# Patient Record
Sex: FEMALE | Race: BLACK OR AFRICAN AMERICAN
[De-identification: names, ages, dates, MRNs, and addresses within clinical notes are randomized per-mention and may not be internally consistent; named-entity substitution may affect disease eponyms.]

---

## 2019-01-03 ENCOUNTER — HOSPITAL ENCOUNTER (OUTPATIENT)
Dept: HOSPITAL 75 - WSO | Age: 39
Discharge: HOME | End: 2019-01-03
Attending: OBSTETRICS & GYNECOLOGY
Payer: MEDICAID

## 2019-01-03 VITALS — BODY MASS INDEX: 34.68 KG/M2 | HEIGHT: 59 IN | WEIGHT: 172 LBS

## 2019-01-03 VITALS — SYSTOLIC BLOOD PRESSURE: 117 MMHG | DIASTOLIC BLOOD PRESSURE: 70 MMHG

## 2019-01-03 VITALS — SYSTOLIC BLOOD PRESSURE: 114 MMHG | DIASTOLIC BLOOD PRESSURE: 66 MMHG

## 2019-01-03 DIAGNOSIS — O47.9: Primary | ICD-10-CM

## 2019-01-03 PROCEDURE — 99213 OFFICE O/P EST LOW 20 MIN: CPT

## 2019-01-03 NOTE — NUR
MEKHI BLANKENSHIP presented to unit via W/C from ED, accompanied by S/O, with c/o FALL,STOMACH 
PAIN. MEKHI BLANKENSHIP weighed, gowned, voided, and to bed.  EFHM and TOCO applied, VS taken.  
MEKHI BLANKENSHIP oriented to bed controls, call light, TV, heat, and A/C controls.

## 2019-01-08 NOTE — PHYSICIAN QUERY-FINAL DX
NICOLÁS SPANN 1/8/19 0048:


Clinic Account Progress/Dx


Physician Query:


Please give diagnosis


Date of Service





Luis Carlos 3, 2019 at 18:44





KARISSA AUGUSTIN MD 1/8/19 0728:


Clinic Account Progress/Dx


DIAGNOSIS:


Diagnosis


False labor











NICOLÁS SPANN Jan 8, 2019 00:48


KARISSA AUGUSTIN MD Jan 8, 2019 07:28

## 2019-02-11 ENCOUNTER — HOSPITAL ENCOUNTER (OUTPATIENT)
Dept: HOSPITAL 75 - LABNPT | Age: 39
End: 2019-02-11
Attending: OBSTETRICS & GYNECOLOGY
Payer: MEDICAID

## 2019-02-11 DIAGNOSIS — O28.8: Primary | ICD-10-CM

## 2019-02-11 LAB
CREAT UR-MCNC: 74 MG/DL (ref 30–125)
PROT UR-MCNC: 12 MG/DL (ref 6–12)

## 2019-02-11 PROCEDURE — 84156 ASSAY OF PROTEIN URINE: CPT

## 2019-02-11 PROCEDURE — 82570 ASSAY OF URINE CREATININE: CPT

## 2019-02-14 ENCOUNTER — HOSPITAL ENCOUNTER (OUTPATIENT)
Dept: HOSPITAL 75 - LABNPT | Age: 39
End: 2019-02-14
Attending: OBSTETRICS & GYNECOLOGY
Payer: MEDICAID

## 2019-02-14 DIAGNOSIS — O14.03: Primary | ICD-10-CM

## 2019-02-14 LAB
CREAT UR-MCNC: 54 MG/DL (ref 30–125)
PROT UR-MCNC: < 6 MG/DL (ref 6–12)

## 2019-02-14 PROCEDURE — 82570 ASSAY OF URINE CREATININE: CPT

## 2019-02-14 PROCEDURE — 84156 ASSAY OF PROTEIN URINE: CPT

## 2019-02-25 ENCOUNTER — HOSPITAL ENCOUNTER (OUTPATIENT)
Dept: HOSPITAL 75 - LABNPT | Age: 39
End: 2019-02-25
Attending: OBSTETRICS & GYNECOLOGY
Payer: MEDICAID

## 2019-02-25 DIAGNOSIS — O14.03: Primary | ICD-10-CM

## 2019-02-25 LAB
CREAT UR-MCNC: 69 MG/DL (ref 30–125)
PROT UR-MCNC: 38 MG/DL (ref 6–12)

## 2019-02-25 PROCEDURE — 84156 ASSAY OF PROTEIN URINE: CPT

## 2019-02-25 PROCEDURE — 82570 ASSAY OF URINE CREATININE: CPT

## 2019-02-28 ENCOUNTER — HOSPITAL ENCOUNTER (INPATIENT)
Dept: HOSPITAL 75 - LDRP | Age: 39
LOS: 3 days | Discharge: HOME | End: 2019-03-03
Attending: OBSTETRICS & GYNECOLOGY | Admitting: OBSTETRICS & GYNECOLOGY
Payer: MEDICAID

## 2019-02-28 ENCOUNTER — HOSPITAL ENCOUNTER (OUTPATIENT)
Dept: HOSPITAL 75 - LABNPT | Age: 39
End: 2019-02-28
Attending: OBSTETRICS & GYNECOLOGY
Payer: MEDICAID

## 2019-02-28 VITALS — SYSTOLIC BLOOD PRESSURE: 146 MMHG | DIASTOLIC BLOOD PRESSURE: 92 MMHG

## 2019-02-28 VITALS — DIASTOLIC BLOOD PRESSURE: 94 MMHG | SYSTOLIC BLOOD PRESSURE: 138 MMHG

## 2019-02-28 VITALS — SYSTOLIC BLOOD PRESSURE: 172 MMHG | DIASTOLIC BLOOD PRESSURE: 102 MMHG

## 2019-02-28 VITALS — BODY MASS INDEX: 35.49 KG/M2 | WEIGHT: 176.05 LBS | HEIGHT: 59 IN

## 2019-02-28 VITALS — DIASTOLIC BLOOD PRESSURE: 103 MMHG | SYSTOLIC BLOOD PRESSURE: 159 MMHG

## 2019-02-28 VITALS — DIASTOLIC BLOOD PRESSURE: 90 MMHG | SYSTOLIC BLOOD PRESSURE: 140 MMHG

## 2019-02-28 VITALS — DIASTOLIC BLOOD PRESSURE: 96 MMHG | SYSTOLIC BLOOD PRESSURE: 151 MMHG

## 2019-02-28 VITALS — DIASTOLIC BLOOD PRESSURE: 134 MMHG | SYSTOLIC BLOOD PRESSURE: 151 MMHG

## 2019-02-28 VITALS — SYSTOLIC BLOOD PRESSURE: 147 MMHG | DIASTOLIC BLOOD PRESSURE: 98 MMHG

## 2019-02-28 VITALS — SYSTOLIC BLOOD PRESSURE: 153 MMHG | DIASTOLIC BLOOD PRESSURE: 100 MMHG

## 2019-02-28 VITALS — DIASTOLIC BLOOD PRESSURE: 107 MMHG | SYSTOLIC BLOOD PRESSURE: 151 MMHG

## 2019-02-28 VITALS — DIASTOLIC BLOOD PRESSURE: 89 MMHG | SYSTOLIC BLOOD PRESSURE: 119 MMHG

## 2019-02-28 VITALS — SYSTOLIC BLOOD PRESSURE: 117 MMHG | DIASTOLIC BLOOD PRESSURE: 86 MMHG

## 2019-02-28 VITALS — DIASTOLIC BLOOD PRESSURE: 107 MMHG | SYSTOLIC BLOOD PRESSURE: 169 MMHG

## 2019-02-28 VITALS — DIASTOLIC BLOOD PRESSURE: 82 MMHG | SYSTOLIC BLOOD PRESSURE: 132 MMHG

## 2019-02-28 VITALS — SYSTOLIC BLOOD PRESSURE: 142 MMHG | DIASTOLIC BLOOD PRESSURE: 96 MMHG

## 2019-02-28 VITALS — SYSTOLIC BLOOD PRESSURE: 155 MMHG | DIASTOLIC BLOOD PRESSURE: 100 MMHG

## 2019-02-28 DIAGNOSIS — O28.8: Primary | ICD-10-CM

## 2019-02-28 DIAGNOSIS — Z3A.36: ICD-10-CM

## 2019-02-28 DIAGNOSIS — O34.211: ICD-10-CM

## 2019-02-28 DIAGNOSIS — Z79.84: ICD-10-CM

## 2019-02-28 DIAGNOSIS — O14.13: Primary | ICD-10-CM

## 2019-02-28 DIAGNOSIS — O24.415: ICD-10-CM

## 2019-02-28 LAB
BASOPHILS # BLD AUTO: 0.1 10^3/UL (ref 0–0.1)
BASOPHILS NFR BLD AUTO: 1 % (ref 0–10)
CREAT UR-MCNC: 43 MG/DL (ref 30–125)
EOSINOPHIL # BLD AUTO: 0.1 10^3/UL (ref 0–0.3)
EOSINOPHIL NFR BLD AUTO: 1 % (ref 0–10)
ERYTHROCYTE [DISTWIDTH] IN BLOOD BY AUTOMATED COUNT: 15 % (ref 10–14.5)
HCT VFR BLD CALC: 31 % (ref 35–52)
HGB BLD-MCNC: 9.9 G/DL (ref 11.5–16)
LYMPHOCYTES # BLD AUTO: 2.9 X 10^3 (ref 1–4)
LYMPHOCYTES NFR BLD AUTO: 31 % (ref 12–44)
MANUAL DIFFERENTIAL PERFORMED BLD QL: NO
MCH RBC QN AUTO: 27 PG (ref 25–34)
MCHC RBC AUTO-ENTMCNC: 33 G/DL (ref 32–36)
MCV RBC AUTO: 83 FL (ref 80–99)
MONOCYTES # BLD AUTO: 0.4 X 10^3 (ref 0–1)
MONOCYTES NFR BLD AUTO: 5 % (ref 0–12)
NEUTROPHILS # BLD AUTO: 5.9 X 10^3 (ref 1.8–7.8)
NEUTROPHILS NFR BLD AUTO: 64 % (ref 42–75)
PLATELET # BLD: 227 10^3/UL (ref 130–400)
PMV BLD AUTO: 9.3 FL (ref 7.4–10.4)
PROT UR-MCNC: 20 MG/DL (ref 6–12)
WBC # BLD AUTO: 9.3 10^3/UL (ref 4.3–11)

## 2019-02-28 PROCEDURE — 86901 BLOOD TYPING SEROLOGIC RH(D): CPT

## 2019-02-28 PROCEDURE — 90686 IIV4 VACC NO PRSV 0.5 ML IM: CPT

## 2019-02-28 PROCEDURE — 82570 ASSAY OF URINE CREATININE: CPT

## 2019-02-28 PROCEDURE — 90715 TDAP VACCINE 7 YRS/> IM: CPT

## 2019-02-28 PROCEDURE — 84156 ASSAY OF PROTEIN URINE: CPT

## 2019-02-28 PROCEDURE — 82962 GLUCOSE BLOOD TEST: CPT

## 2019-02-28 PROCEDURE — 80053 COMPREHEN METABOLIC PANEL: CPT

## 2019-02-28 PROCEDURE — 90471 IMMUNIZATION ADMIN: CPT

## 2019-02-28 PROCEDURE — 36415 COLL VENOUS BLD VENIPUNCTURE: CPT

## 2019-02-28 PROCEDURE — 86850 RBC ANTIBODY SCREEN: CPT

## 2019-02-28 PROCEDURE — 86900 BLOOD TYPING SEROLOGIC ABO: CPT

## 2019-02-28 PROCEDURE — 85025 COMPLETE CBC W/AUTO DIFF WBC: CPT

## 2019-02-28 PROCEDURE — 94664 DEMO&/EVAL PT USE INHALER: CPT

## 2019-02-28 PROCEDURE — 83615 LACTATE (LD) (LDH) ENZYME: CPT

## 2019-02-28 RX ADMIN — Medication SCH MLS/HR: at 18:24

## 2019-02-28 RX ADMIN — OXYCODONE HYDROCHLORIDE AND ACETAMINOPHEN PRN TAB: 10; 325 TABLET ORAL at 21:00

## 2019-02-28 RX ADMIN — SODIUM CHLORIDE, SODIUM LACTATE, POTASSIUM CHLORIDE, AND CALCIUM CHLORIDE PRN MLS/HR: 600; 310; 30; 20 INJECTION, SOLUTION INTRAVENOUS at 15:27

## 2019-02-28 RX ADMIN — DOCUSATE SODIUM SCH MG: 100 CAPSULE ORAL at 20:59

## 2019-02-28 RX ADMIN — LABETALOL HCL SCH MG: 200 TABLET, FILM COATED ORAL at 23:00

## 2019-02-28 RX ADMIN — SODIUM CHLORIDE, SODIUM LACTATE, POTASSIUM CHLORIDE, AND CALCIUM CHLORIDE PRN MLS/HR: 600; 310; 30; 20 INJECTION, SOLUTION INTRAVENOUS at 16:10

## 2019-02-28 RX ADMIN — MAGNESIUM SULFATE IN WATER SCH MLS/HR: 40 INJECTION, SOLUTION INTRAVENOUS at 18:17

## 2019-02-28 NOTE — NUR
#20G IV x1 attempt to Rt.AC by KENNETH Tejeda.  site patent.  admission labs collected from 
site prior to IVF's infusing. LR bolus infusing with OR tubing per gravity.

IV attempt x1 by this RN, unsuccessful.

## 2019-02-28 NOTE — NUR
admitting assessment completed. pt reports c/o's HA x "several weeks" without visual 
changes.  DTR's WNL.  trace pitting pedal  edema noted. denies epigastric pain.  

admission paperwork completed. pt states "'s sister (Gabi Fisher, lives in ) will 
be guardian" of infant. pt will contact  after delivery of infant.

## 2019-02-28 NOTE — NUR
monitors dc'd.  pt ambulated to OB c/s room with OR staff @ side. pt stable with no sx's of 
distress noted.

## 2019-02-28 NOTE — HISTORY & PHYSICAL
History and Physical


Date Seen by Provider:  2019


Time Seen by Provider:  16:30


This patient is a 38-year-old   5 black female with an EDC of 4 

1419 putting her at 35 weeks gestation on this date seen in my clinic in follow-

up from [facility] evaluation concerning for developing preeclampsia.  Repeat lab 

work showed an elevated ALT an AST, an elevated LDH platelet count that was 

slightly decreasing and a urine protein creatinine ratio 0.47 and pressure was 

in the 140s over 100.  She is sent to labor and delivery for management 

including repeat  delivery secondary to severe preeclampsia with 

developing help syndrome patient denied ruptured membranes or bleeding.  A GBS 

culture had been obtained on this date and that was results of course is not 

available.  Patient's pregnancy was complicated by gestational diabetes for 

which she was on metformin taken 500 mg 3 times a day with acceptable blood 

sugar control on that regimen.





Allergies are to ibuprofen which causes a rash


Medications are metformin 500 mg 3 times a day and prenatal vitamins





Past medical history, past surgical history, obstetric history, family history, 

and social histories are per the antepartum record





HEENT exam is normal


Neck is supple with no lymphadenopathy and no thyromegaly


Abdomen gravid soft nontender nondistended


Extremities show clubbing or cyanosis.  There is no Homans sign.  There is some 

pretibial pitting edema and patient had DTRs are 3+ to 4 over 4 globally





Pelvic exam shows a cervix closed 50 percent effaced -2 station with a vertex 

presentation





Assessment and plan   35 week gestation with severe preeclampsia/help syndrome 

in a patient with 5 previous C-sections and a history of gestational diabetes.  

She was admitted and then fairly shortly taken to the operating room for repeat 

 delivery.  Plan would be for maintenance on IV magnesium post 

operatively until its apparent that preeclampsia is resolving


36 weeks gestation with severe preeclampsia 5 previous 





Allergies and Home Medications


Allergies


Coded Allergies:  


     ibuprofen (Unverified  Allergy, Mild, 09)





Home Medications


Ferrous Sulfate 325 Mg Tablet, 325 MG PO TIDWM


   Prescribed by: SEAN MONTEJO on 16 1525


Myf159/Iron Fumarate/FA/Dss 1 Each Tablet, 1 EACH PO DAILY, (Reported)





Patient Home Medication List


Home Medication List Reviewed:  Yes











KARISSA AUGUSTIN MD 2019 17:53

## 2019-02-28 NOTE — NUR
MEKHI BLANKENSHIP presented to unit via  ambulation, accompanied by fmaily members, with Pre-E 
and direct admit by Dr. Pt. weighed, gowned, voided, and to bed.  EFHM and TOCO applied, VS 
taken.  Pt. oriented to bed controls, call light, TV, heat, and A/C controls.

## 2019-02-28 NOTE — NUR
pt transferred to room 313 via bed with this RN and LUISA Conway @ side. familiarized with 
room surroundings.  call light within reach.

## 2019-03-01 VITALS — SYSTOLIC BLOOD PRESSURE: 131 MMHG | DIASTOLIC BLOOD PRESSURE: 89 MMHG

## 2019-03-01 VITALS — DIASTOLIC BLOOD PRESSURE: 98 MMHG | SYSTOLIC BLOOD PRESSURE: 155 MMHG

## 2019-03-01 VITALS — DIASTOLIC BLOOD PRESSURE: 84 MMHG | SYSTOLIC BLOOD PRESSURE: 132 MMHG

## 2019-03-01 VITALS — DIASTOLIC BLOOD PRESSURE: 94 MMHG | SYSTOLIC BLOOD PRESSURE: 146 MMHG

## 2019-03-01 VITALS — DIASTOLIC BLOOD PRESSURE: 85 MMHG | SYSTOLIC BLOOD PRESSURE: 129 MMHG

## 2019-03-01 VITALS — SYSTOLIC BLOOD PRESSURE: 133 MMHG | DIASTOLIC BLOOD PRESSURE: 87 MMHG

## 2019-03-01 VITALS — SYSTOLIC BLOOD PRESSURE: 135 MMHG | DIASTOLIC BLOOD PRESSURE: 92 MMHG

## 2019-03-01 VITALS — DIASTOLIC BLOOD PRESSURE: 88 MMHG | SYSTOLIC BLOOD PRESSURE: 129 MMHG

## 2019-03-01 VITALS — DIASTOLIC BLOOD PRESSURE: 95 MMHG | SYSTOLIC BLOOD PRESSURE: 160 MMHG

## 2019-03-01 VITALS — DIASTOLIC BLOOD PRESSURE: 76 MMHG | SYSTOLIC BLOOD PRESSURE: 121 MMHG

## 2019-03-01 VITALS — SYSTOLIC BLOOD PRESSURE: 151 MMHG | DIASTOLIC BLOOD PRESSURE: 97 MMHG

## 2019-03-01 VITALS — DIASTOLIC BLOOD PRESSURE: 107 MMHG | SYSTOLIC BLOOD PRESSURE: 158 MMHG

## 2019-03-01 LAB
ALBUMIN SERPL-MCNC: 2.8 GM/DL (ref 3.2–4.5)
ALP SERPL-CCNC: 126 U/L (ref 40–136)
ALT SERPL-CCNC: 134 U/L (ref 0–55)
BASOPHILS # BLD AUTO: 0 10^3/UL (ref 0–0.1)
BASOPHILS NFR BLD AUTO: 0 % (ref 0–10)
BILIRUB SERPL-MCNC: 0.2 MG/DL (ref 0.1–1)
BUN/CREAT SERPL: 3
CALCIUM SERPL-MCNC: 7.8 MG/DL (ref 8.5–10.1)
CHLORIDE SERPL-SCNC: 108 MMOL/L (ref 98–107)
CO2 SERPL-SCNC: 18 MMOL/L (ref 21–32)
CREAT SERPL-MCNC: 0.86 MG/DL (ref 0.6–1.3)
EOSINOPHIL # BLD AUTO: 0.1 10^3/UL (ref 0–0.3)
EOSINOPHIL NFR BLD AUTO: 1 % (ref 0–10)
ERYTHROCYTE [DISTWIDTH] IN BLOOD BY AUTOMATED COUNT: 14.9 % (ref 10–14.5)
GFR SERPLBLD BASED ON 1.73 SQ M-ARVRAT: > 60 ML/MIN
GLUCOSE SERPL-MCNC: 111 MG/DL (ref 70–105)
HCT VFR BLD CALC: 33 % (ref 35–52)
HGB BLD-MCNC: 10.3 G/DL (ref 11.5–16)
LYMPHOCYTES # BLD AUTO: 2.5 X 10^3 (ref 1–4)
LYMPHOCYTES NFR BLD AUTO: 28 % (ref 12–44)
MANUAL DIFFERENTIAL PERFORMED BLD QL: NO
MCH RBC QN AUTO: 26 PG (ref 25–34)
MCHC RBC AUTO-ENTMCNC: 31 G/DL (ref 32–36)
MCV RBC AUTO: 84 FL (ref 80–99)
MONOCYTES # BLD AUTO: 0.5 X 10^3 (ref 0–1)
MONOCYTES NFR BLD AUTO: 5 % (ref 0–12)
NEUTROPHILS # BLD AUTO: 5.9 X 10^3 (ref 1.8–7.8)
NEUTROPHILS NFR BLD AUTO: 66 % (ref 42–75)
PLATELET # BLD: 206 10^3/UL (ref 130–400)
PMV BLD AUTO: 9.2 FL (ref 7.4–10.4)
POTASSIUM SERPL-SCNC: 3.1 MMOL/L (ref 3.6–5)
PROT SERPL-MCNC: 5.7 GM/DL (ref 6.4–8.2)
SODIUM SERPL-SCNC: 137 MMOL/L (ref 135–145)
WBC # BLD AUTO: 9 10^3/UL (ref 4.3–11)

## 2019-03-01 RX ADMIN — OXYCODONE HYDROCHLORIDE AND ACETAMINOPHEN PRN TAB: 10; 325 TABLET ORAL at 23:05

## 2019-03-01 RX ADMIN — LABETALOL HCL SCH MG: 200 TABLET, FILM COATED ORAL at 21:03

## 2019-03-01 RX ADMIN — OXYCODONE HYDROCHLORIDE AND ACETAMINOPHEN PRN TAB: 10; 325 TABLET ORAL at 07:58

## 2019-03-01 RX ADMIN — OXYCODONE HYDROCHLORIDE AND ACETAMINOPHEN PRN TAB: 10; 325 TABLET ORAL at 17:10

## 2019-03-01 RX ADMIN — OXYCODONE HYDROCHLORIDE AND ACETAMINOPHEN PRN TAB: 10; 325 TABLET ORAL at 12:52

## 2019-03-01 RX ADMIN — DOCUSATE SODIUM SCH MG: 100 CAPSULE ORAL at 21:03

## 2019-03-01 RX ADMIN — OXYCODONE HYDROCHLORIDE AND ACETAMINOPHEN PRN TAB: 10; 325 TABLET ORAL at 03:19

## 2019-03-01 RX ADMIN — DOCUSATE SODIUM SCH MG: 100 CAPSULE ORAL at 09:12

## 2019-03-01 RX ADMIN — LABETALOL HCL SCH MG: 200 TABLET, FILM COATED ORAL at 09:11

## 2019-03-01 RX ADMIN — METOCLOPRAMIDE SCH MG: 10 TABLET ORAL at 14:12

## 2019-03-01 RX ADMIN — Medication SCH MLS/HR: at 06:39

## 2019-03-01 RX ADMIN — MAGNESIUM SULFATE IN WATER SCH MLS/HR: 40 INJECTION, SOLUTION INTRAVENOUS at 04:07

## 2019-03-01 NOTE — NUR
Rn to bedside.  pt resting in bed eyes closed.  vital signs obtained.  plan of care reviewed 
with pt.  pt dozing off when visiting with rn.  pt reports she did not get much sleep last 
night.

## 2019-03-01 NOTE — NUR
rn to pt bedside.  pt resting eyes closed.  plan of care reviewed with pt and verbalized 
understanding.  iv fluids to saline lock and moon catheter dc'd.  peircare, pad changed and 
underwear on.  noted small amt of rubra.

## 2019-03-01 NOTE — OPERATIVE REPORT
DATE OF SERVICE:  2019



PREOPERATIVE DIAGNOSIS:

A 35 weeks gestation with severe preeclampsia and 5 previous  sections.



POSTOPERATIVE DIAGNOSIS:

A 35 weeks gestation with severe preeclampsia and 5 previous  sections.



OPERATIVE PROCEDURE:

Repeat low transverse  delivery of a viable male infant with Apgars of

3, 6, and 8 at 1, 5 and 10 minutes respectively.  Weight is 5 lbs. 5 oz.  Cord 
blood

gas of 7.26 and a birth time of 17:07.



OPERATIVE DESCRIPTION:

With the patient in the supine position under satisfactory spinal analgesia, she

was prepped and draped in the usual fashion for abdominal surgery.  Gunter

catheter was placed in the urinary bladder.



Repeat Pfannenstiel incision made through skin with a scalpel by removing the

patient's previous Pfannenstiel incisional scar.  The abdomen was entered in the

usual manner.  Bladder retractor placed in position, clean scalpel used to make

a 4 cm hysterotomy incision transversely across the lower uterine segment that

was exceedingly thin and fibrotic secondary to her previous  deliveries.

 Copious clear fluid was released on hysterotomy.  A vigorous viable male infant

was delivered via the uterine incision.  Infant had stats as noted above.



The infant was bulb suctioned on delivery of the head and again on completion of

delivery.  The umbilical cord was doubly clamped and cut and baby was passed to

Dr. Mackay, the pediatrician in attendance for delivery.



Cord bloods were obtained.  The placenta delivered manually and was normal with

a 3-vessel cord.  The uterus was exteriorized and interior wiped clean with a

wet laparotomy sponge.  Uterine incision closed with a running locked suture of

2-0 Vicryl.  Hemostasis was satisfactory.  The uterus was returned to the

abdominal cavity.  All blood clot and debris was removed from the abdominal

cavity.  Prior to replacing the uterus to the abdomen, the bladder dome was

imbricated on itself secondary to some bleeding and some superficial vessels at

that point.  Hemostasis was complete.  The uterus was emptied of all blood clot

and debris and the anterior retroperitoneum was closed with a running suture of

2-0 Vicryl.  Rectus muscles were closed with that suture as well.  The rectus

fascia was closed with 2-0 Vicryl, subcutaneous tissue with 2-0 Vicryl and the

skin was stapled.



Sponge, needle counts were correct on completion of the procedure.  Estimated

blood loss for procedure was around 200 mL.  The patient tolerated the procedure

well and was transferred to recovery room in stable condition and started on

magnesium in the recovery room for her severe preeclampsia.  The baby was taken

to the full term nursery under the care of Dr. Mackay.





Job ID: [id number]

DocumentID: [id number]

Dictated Date:  2019 18:08:17

Transcription Date: 2019 03:25:48

Dictated By: KARISSA AUGUSTIN MD

MTDD

## 2019-03-01 NOTE — NUR
ambulates 50 feet and then back to room.  another warm blanket placed on right upper abd for 
comfort

## 2019-03-01 NOTE — NUR
pt requests to go to bathroom.  assisted out of bed and ambulates slowly to bathroom.  void, 
pericare (assisted by rn).  Ambulates from bathroom to room door and then back to bed.  
scd's resumed.  fresh ice water to bedside table.

pt c/o continued right upper abd pain.  warm blanket placed to area.

## 2019-03-01 NOTE — PROGRESS NOTE-STANDARD
Standard Progress Note


Progress Notes/Assess & Plan


Date Seen by a Provider:  Mar 1, 2019


Time Seen by a Provider:  07:25


Progress/Assessment & Plan


This patient is without complaint.  She has good pain control.  Patient denies 

nausea vomiting.  She is tolerating oral intake well.








Vital Signs








  Date Time  Temp Pulse Resp B/P (MAP) Pulse Ox O2 Delivery O2 Flow Rate FiO2


 


3/1/19 06:00  65 18     


 


3/1/19 05:00  66 18     


 


3/1/19 04:00 97.3 63 18 121/76 (91) 100 Room Air  


 


3/1/19 04:00  64 18     


 


3/1/19 03:00  64 18     


 


3/1/19 02:04     100 Room Air  


 


3/1/19 02:03  62 18     


 


3/1/19 01:00  56 18     


 


3/1/19 00:00 97.1 64 18 133/87 (102) 100 Room Air  


 


3/1/19 00:00  64 18     


 


19 23:00  69 18     


 


19 22:00  65 18     


 


19 20:10  63 18     


 


19 20:10 96.5  18 169/107 (127) 100 Room Air  


 


19 19:20  65 18     


 


19 18:50    117/86 (96)    


 


19 18:43 97.0  16 172/102 (125) 98 Room Air  


 


19 18:33 96.4 62 16 155/100 (118) 100 Room Air  


 


19 18:18 96.9 70 16 151/134 (140) 98 Room Air  


 


19 18:18  70 16     


 


19 18:01 97.1 85 16 142/96 (111) 98 Room Air  


 


19 17:48 97.2 77 16 119/89 (99) 100 Room Air  


 


19 16:35  90 18 159/103 (121)  Room Air  


 


19 16:20  86 18 153/100 (117)  Room Air  


 


19 16:05  91 18 132/82 (99)  Room Air  


 


19 15:50  93 18 146/92 (110)  Room Air  


 


19 15:11 98.1 98 18 151/96 (114)  Room Air  














I & O 


 


 3/1/19





 07:00


 


Intake Total 6050 ml


 


Output Total 7700 ml


 


Balance -1650 ml





Blood pressures have been somewhat labile but have responded to IV and by mouth 

labetalol and are acceptable at this point








Laboratory Tests








Test


 19


15:27 19


16:06 3/1/19


06:10 Range/Units


 


 


White Blood Count


 9.3 


 


 9.0 


 4.3-11.0


10^3/uL


 


Red Blood Count


 3.66 L


 


 3.92 L


 4.35-5.85


10^6/uL


 


Hemoglobin 9.9 L  10.3 L 11.5-16.0  G/DL


 


Hematocrit 31 L  33 L 35-52  %


 


Mean Corpuscular Volume 83   84  80-99  FL


 


Mean Corpuscular Hemoglobin 27   26  25-34  PG


 


Mean Corpuscular Hemoglobin


Concent 33 


 


 31 L


 32-36  G/DL





 


Red Cell Distribution Width 15.0 H  14.9 H 10.0-14.5  %


 


Platelet Count


 227 


 


 206 


 130-400


10^3/uL


 


Mean Platelet Volume 9.3   9.2  7.4-10.4  FL


 


Neutrophils (%) (Auto) 64   66  42-75  %


 


Lymphocytes (%) (Auto) 31   28  12-44  %


 


Monocytes (%) (Auto) 5   5  0-12  %


 


Eosinophils (%) (Auto) 1   1  0-10  %


 


Basophils (%) (Auto) 1   0  0-10  %


 


Neutrophils # (Auto) 5.9   5.9  1.8-7.8  X 10^3


 


Lymphocytes # (Auto) 2.9   2.5  1.0-4.0  X 10^3


 


Monocytes # (Auto) 0.4   0.5  0.0-1.0  X 10^3


 


Eosinophils # (Auto)


 0.1 


 


 0.1 


 0.0-0.3


10^3/uL


 


Basophils # (Auto)


 0.1 


 


 0.0 


 0.0-0.1


10^3/uL


 


Glucometer  241 H    MG/DL


 


Sodium Level   137  135-145  MMOL/L


 


Potassium Level   3.1 L 3.6-5.0  MMOL/L


 


Chloride Level   108 H   MMOL/L


 


Carbon Dioxide Level   18 L 21-32  MMOL/L


 


Anion Gap   11  5-14  MMOL/L


 


Blood Urea Nitrogen   3 L 7-18  MG/DL


 


Creatinine


 


 


 0.86 


 0.60-1.30


MG/DL


 


Estimat Glomerular Filtration


Rate 


 


 > 60 


  





 


BUN/Creatinine Ratio   3   


 


Glucose Level   111 H   MG/DL


 


Calcium Level   7.8 L 8.5-10.1  MG/DL


 


Corrected Calcium   8.8  8.5-10.1  MG/DL


 


Total Bilirubin   0.2  0.1-1.0  MG/DL


 


Aspartate Amino Transf


(AST/SGOT) 


 


 91 H


 5-34  U/L





 


Alanine Aminotransferase


(ALT/SGPT) 


 


 134 H


 0-55  U/L





 


Alkaline Phosphatase   126    U/L


 


Lactate Dehydrogenase   356 H 125-220  U/L


 


Total Protein   5.7 L 6.4-8.2  GM/DL


 


Albumin   2.8 L 3.2-4.5  GM/DL





Liver enzymes remain slightly elevated.  The LDH this is elevated as well.  

These are in the same range as the preadmission labs yesterday.





Physical exam shows a benign abdomen.


Extremities show no clubbing or cyanosis.  There is no Homans sign.





Assessment and plan   postoperative day number 1 status post repeat  

delivery at 36 weeks' gestation due to severe preeclampsia in a patient who is 

diabetic and has had prior C-sections 5.  It appears preeclampsia is resolving 

this point we will call her magnesium and her IV fluids and remove the Gunter 

catheter and allowing ambulation.  Continue the labetalol 100 mg twice a day in 

the short-term and a close attention to her blood pressures and other vitals











KARISSA AUGUSTIN MD Mar 1, 2019 07:28

## 2019-03-01 NOTE — NUR
PT IN BED, LYING DOWN. PAIN MEDS GIVEN PO PER PT REQUEST; SEE EMAR FOR FURTHER. PT DENIES 
ANY FURTHER NEEDS AT THIS TIME. CALL LIGHT WITHIN REACH.

## 2019-03-01 NOTE — NUR
pt reports right upper abd pain is better after heat and rates pain 8:10.  denies further 
needs at this time.

## 2019-03-01 NOTE — NUR
PT RESTING IN BED. ASSESSMENT COMPLETED. PT DENIES ANY NEEDS AT THIS TIME. WILL CONTINUE TO 
MONITOR.

## 2019-03-01 NOTE — ANESTHESIA-REGIONAL POST-OP
Regional


Patient Condition


Mental Status:  Alert, Oriented x3


Circulation:  Same as Pre-Op


Headache:  Absent


Sensation:  Full Recovery


Motor Block:  Absent





Post Op Complications


Complications


None





Follow Up Care/Instructions


Patient Instructions


None needed.





Anesthesia/Patient Condition


Patient is doing well, no complaints, stable vital signs, no apparent adverse 

anesthesia problems.   


No complications reported per nursing.











CARISSA ANN CRNA Mar 1, 2019 07:51

## 2019-03-02 VITALS — DIASTOLIC BLOOD PRESSURE: 94 MMHG | SYSTOLIC BLOOD PRESSURE: 147 MMHG

## 2019-03-02 VITALS — DIASTOLIC BLOOD PRESSURE: 79 MMHG | SYSTOLIC BLOOD PRESSURE: 127 MMHG

## 2019-03-02 VITALS — SYSTOLIC BLOOD PRESSURE: 136 MMHG | DIASTOLIC BLOOD PRESSURE: 82 MMHG

## 2019-03-02 VITALS — DIASTOLIC BLOOD PRESSURE: 80 MMHG | SYSTOLIC BLOOD PRESSURE: 130 MMHG

## 2019-03-02 VITALS — DIASTOLIC BLOOD PRESSURE: 81 MMHG | SYSTOLIC BLOOD PRESSURE: 133 MMHG

## 2019-03-02 RX ADMIN — LABETALOL HCL SCH MG: 200 TABLET, FILM COATED ORAL at 21:29

## 2019-03-02 RX ADMIN — OXYCODONE HYDROCHLORIDE AND ACETAMINOPHEN PRN TAB: 10; 325 TABLET ORAL at 23:43

## 2019-03-02 RX ADMIN — OXYCODONE HYDROCHLORIDE AND ACETAMINOPHEN PRN TAB: 10; 325 TABLET ORAL at 11:07

## 2019-03-02 RX ADMIN — METOCLOPRAMIDE SCH MG: 10 TABLET ORAL at 09:21

## 2019-03-02 RX ADMIN — OXYCODONE HYDROCHLORIDE AND ACETAMINOPHEN PRN TAB: 10; 325 TABLET ORAL at 04:59

## 2019-03-02 RX ADMIN — METOCLOPRAMIDE SCH MG: 10 TABLET ORAL at 21:28

## 2019-03-02 RX ADMIN — OXYCODONE HYDROCHLORIDE AND ACETAMINOPHEN PRN TAB: 10; 325 TABLET ORAL at 17:37

## 2019-03-02 RX ADMIN — METOCLOPRAMIDE SCH MG: 10 TABLET ORAL at 17:37

## 2019-03-02 RX ADMIN — DOCUSATE SODIUM SCH MG: 100 CAPSULE ORAL at 09:21

## 2019-03-02 RX ADMIN — DOCUSATE SODIUM SCH MG: 100 CAPSULE ORAL at 21:28

## 2019-03-02 RX ADMIN — LABETALOL HCL SCH MG: 200 TABLET, FILM COATED ORAL at 09:21

## 2019-03-02 NOTE — NUR
PT RESTING IN BED. ASSESSMENT COMPLETED. PT REQUESTING ICE CREAM AT THIS TIME. SUPERVISOR 
NOTIFIED.

## 2019-03-02 NOTE — NUR
FLU VACCINE GIVEN IM IN RIGHT DELTOID.  PT DENIES HAVING FLU SHOT. NO DOCUMENTATION OF FLU 
VACCINE.

## 2019-03-02 NOTE — DISCHARGE INSTRUCTIONS
Discharge Instructions


Discharge Medications


New, Converted or Re-Newed RX:  RX on Chart





Patient Instructions


Patient Instructions:  


As directed


Return to The Hospital For:  


As directed





Activity & Diet


Discharge Diet:  No Restrictions


Activity as Tolerated:  No





Orders-Post D/C & Referrals





Follow Up Appt:


RTC on 2019 at 930 a.m. for incision check.


Call to make follow up appt. for patient in 4 weeks.





Wound Care:


Remove staples, apply benzoin and steri strips.





Activity 


Per routine post  instructions.





Please call in RX to patient pharmacy.





Diet as tolerated





Patient may shower or tub bathe as desired.





Continue home meds











KARISSA AUGUSTIN MD Mar 2, 2019 09:00

## 2019-03-02 NOTE — PROGRESS NOTE-STANDARD
Standard Progress Note


Progress Notes/Assess & Plan


Date Seen by a Provider:  Mar 2, 2019


Time Seen by a Provider:  08:54


Progress/Assessment & Plan


This patient is without complaint.  She has good pain control.  Patient denies 

nausea vomiting.  She is tolerating oral intake well.








Vital Signs








  Date Time  Temp Pulse Resp B/P (MAP) Pulse Ox O2 Delivery O2 Flow Rate FiO2


 


3/1/19 06:00  65 18     


 


3/1/19 05:00  66 18     


 


3/1/19 04:00 97.3 63 18 121/76 (91) 100 Room Air  


 


3/1/19 04:00  64 18     


 


3/1/19 03:00  64 18     


 


3/1/19 02:04     100 Room Air  


 


3/1/19 02:03  62 18     


 


3/1/19 01:00  56 18     


 


3/1/19 00:00 97.1 64 18 133/87 (102) 100 Room Air  


 


3/1/19 00:00  64 18     


 


19 23:00  69 18     


 


19 22:00  65 18     


 


19 20:10  63 18     


 


19 20:10 96.5  18 169/107 (127) 100 Room Air  


 


19 19:20  65 18     


 


19 18:50    117/86 (96)    


 


19 18:43 97.0  16 172/102 (125) 98 Room Air  


 


19 18:33 96.4 62 16 155/100 (118) 100 Room Air  


 


19 18:18 96.9 70 16 151/134 (140) 98 Room Air  


 


19 18:18  70 16     


 


19 18:01 97.1 85 16 142/96 (111) 98 Room Air  


 


19 17:48 97.2 77 16 119/89 (99) 100 Room Air  


 


19 16:35  90 18 159/103 (121)  Room Air  


 


19 16:20  86 18 153/100 (117)  Room Air  


 


19 16:05  91 18 132/82 (99)  Room Air  


 


19 15:50  93 18 146/92 (110)  Room Air  


 


19 15:11 98.1 98 18 151/96 (114)  Room Air  














I & O 


 


 3/1/19





 07:00


 


Intake Total 6050 ml


 


Output Total 7700 ml


 


Balance -1650 ml





Blood pressures have been somewhat labile but have responded to IV and by mouth 

labetalol and are acceptable at this point








Laboratory Tests








Test


 19


15:27 19


16:06 3/1/19


06:10 Range/Units


 


 


White Blood Count


 9.3 


 


 9.0 


 4.3-11.0


10^3/uL


 


Red Blood Count


 3.66 L


 


 3.92 L


 4.35-5.85


10^6/uL


 


Hemoglobin 9.9 L  10.3 L 11.5-16.0  G/DL


 


Hematocrit 31 L  33 L 35-52  %


 


Mean Corpuscular Volume 83   84  80-99  FL


 


Mean Corpuscular Hemoglobin 27   26  25-34  PG


 


Mean Corpuscular Hemoglobin


Concent 33 


 


 31 L


 32-36  G/DL





 


Red Cell Distribution Width 15.0 H  14.9 H 10.0-14.5  %


 


Platelet Count


 227 


 


 206 


 130-400


10^3/uL


 


Mean Platelet Volume 9.3   9.2  7.4-10.4  FL


 


Neutrophils (%) (Auto) 64   66  42-75  %


 


Lymphocytes (%) (Auto) 31   28  12-44  %


 


Monocytes (%) (Auto) 5   5  0-12  %


 


Eosinophils (%) (Auto) 1   1  0-10  %


 


Basophils (%) (Auto) 1   0  0-10  %


 


Neutrophils # (Auto) 5.9   5.9  1.8-7.8  X 10^3


 


Lymphocytes # (Auto) 2.9   2.5  1.0-4.0  X 10^3


 


Monocytes # (Auto) 0.4   0.5  0.0-1.0  X 10^3


 


Eosinophils # (Auto)


 0.1 


 


 0.1 


 0.0-0.3


10^3/uL


 


Basophils # (Auto)


 0.1 


 


 0.0 


 0.0-0.1


10^3/uL


 


Glucometer  241 H    MG/DL


 


Sodium Level   137  135-145  MMOL/L


 


Potassium Level   3.1 L 3.6-5.0  MMOL/L


 


Chloride Level   108 H   MMOL/L


 


Carbon Dioxide Level   18 L 21-32  MMOL/L


 


Anion Gap   11  5-14  MMOL/L


 


Blood Urea Nitrogen   3 L 7-18  MG/DL


 


Creatinine


 


 


 0.86 


 0.60-1.30


MG/DL


 


Estimat Glomerular Filtration


Rate 


 


 > 60 


  





 


BUN/Creatinine Ratio   3   


 


Glucose Level   111 H   MG/DL


 


Calcium Level   7.8 L 8.5-10.1  MG/DL


 


Corrected Calcium   8.8  8.5-10.1  MG/DL


 


Total Bilirubin   0.2  0.1-1.0  MG/DL


 


Aspartate Amino Transf


(AST/SGOT) 


 


 91 H


 5-34  U/L





 


Alanine Aminotransferase


(ALT/SGPT) 


 


 134 H


 0-55  U/L





 


Alkaline Phosphatase   126    U/L


 


Lactate Dehydrogenase   356 H 125-220  U/L


 


Total Protein   5.7 L 6.4-8.2  GM/DL


 


Albumin   2.8 L 3.2-4.5  GM/DL





Liver enzymes remain slightly elevated.  The LDH this is elevated as well.  

These are in the same range as the preadmission labs yesterday.





Physical exam shows a benign abdomen.


Extremities show no clubbing or cyanosis.  There is no Homans sign.





Assessment and plan   postoperative day number 1 status post repeat  

delivery at 36 weeks' gestation due to severe preeclampsia in a patient who is 

diabetic and has had prior C-sections 5.  It appears preeclampsia is resolving 

this point we will call her magnesium and her IV fluids and remove the Gunter 

catheter and allowing ambulation.  Continue the labetalol 100 mg twice a day in 

the short-term and a close attention to her blood pressures and other vitals








2019


Patient is without complaint.  She is ablating, voiding, tolerating oral intake 

well has good control of her pain, patient denies chest pain, denies shortness 

of breath, denies headache, denies nausea vomiting.








Vital Signs








  Date Time  Temp Pulse Resp B/P (MAP) Pulse Ox O2 Delivery O2 Flow Rate FiO2


 


3/2/19 05:51 98.6 80 18 130/80 (97) 100 Room Air  


 


3/2/19 02:30 98.3 80 18 133/81 (98) 100 Room Air  


 


3/1/19 21:30 98.6 77 19 160/95 (116) 100 Room Air  


 


3/1/19 15:37 96.8 65 19 158/107 (124) 100 Room Air  


 


3/1/19 11:14 96.9 58 19 129/88 (102) 100 Room Air  


 


3/1/19 09:12 97.0       














I & O 


 


 3/2/19





 07:00


 


Intake Total 5815 ml


 


Output Total 7000 ml


 


Balance -1185 ml





Blood pressures are generally stable and are decreasing to a more normal range 

with the labetalol





The abdomen is benign.  Surgical incision is clean dry and intact.


Extremities show no clubbing or cyanosis.  There is no Homans sign.





Assessment and plan   postoperative day number 2 status post repeat  

delivery due to severe preeclampsia.  Patient doing well and we will entertain 

discharge home today or tomorrow as she prefers


Final Diagnosis


Repeat  delivery at 36 weeks gestation











KARISSA AUGUSTIN MD Mar 2, 2019 08:56

## 2019-03-02 NOTE — NUR
PERCOCET 10/325 2 TABS P.O. FOR C/O ABD PAIN. PT HAS BEEN RESTING THIS AFTERNOON. FAMILY 
BROUGHT SOME FOOD AND SHE IS CURRENTLY EATING.

## 2019-03-03 VITALS — SYSTOLIC BLOOD PRESSURE: 136 MMHG | DIASTOLIC BLOOD PRESSURE: 71 MMHG

## 2019-03-03 VITALS — SYSTOLIC BLOOD PRESSURE: 135 MMHG | DIASTOLIC BLOOD PRESSURE: 84 MMHG

## 2019-03-03 RX ADMIN — DOCUSATE SODIUM SCH MG: 100 CAPSULE ORAL at 08:32

## 2019-03-03 RX ADMIN — OXYCODONE HYDROCHLORIDE AND ACETAMINOPHEN PRN TAB: 10; 325 TABLET ORAL at 06:14

## 2019-03-03 RX ADMIN — LABETALOL HCL SCH MG: 200 TABLET, FILM COATED ORAL at 08:31

## 2019-03-03 RX ADMIN — METOCLOPRAMIDE SCH MG: 10 TABLET ORAL at 06:14

## 2019-03-03 NOTE — NUR
Discharge instructions explained, signed and copy to patient.  pt verbalized understanding 
of instructions and denied questions.  prescriptions called to pharmacy and percocet 
prescription handed to pt.  

gauze placed on lower left incision area due to skin on skin contact.  pt instructed to 
change out if/when soiled.  pt verbalized understanding.

## 2019-03-03 NOTE — XMS REPORT
Continuity of Care Document

 Created on: 2019



MEKHI BLANKENSHIP

External Reference #: [id number]

: 1980

Sex: Female



Demographics







 [place]  [address]

 

 Home Phone  [phone] x

 

 Preferred Language  Unknown

 

 Marital Status  Unknown

 

 Adventism Affiliation  Unknown

 

 Race  Unknown

 

 Ethnic Group  Unknown





Author







 Author  [facility]

 

 Address  Unknown

 

 Phone  Unavailable



              



Allergies

      





 Active            Description            Code            Type            
Severity            Reaction            Onset            Reported/Identified   
         Relationship to Patient            Clinical Status        

 

 Yes            Darvocet-N 100                         Drug Allergy            N
/A            N/A                         2009                           
       

 

 Yes            ibuprofen                         Drug Allergy            N/A  
          N/A                         2009                               
   

 

 Yes            Darvocet-N 100                         Drug Allergy            
                                       2009                              
    

 

 Yes            ibuprofen                         Drug Allergy                 
                                  2009                                  

 

 Yes            piroxicam                         Drug Allergy            N/A  
          N/A                         2009                               
   

 

 Yes            piroxicam                         Drug Allergy                 
                                  2009                                  

 

 Yes            ibuprofen            [id number]            Drug Allergy        
    Mild            N/A                         2009                     
             

 

 Yes            Neurontin                         Drug Allergy            N/A  
          N/A                         10/28/2010                               
   

 

 Yes            Neurontin                         Drug Allergy                 
                                  10/28/2010                                  

 

 Yes            amitriptyline                         Drug Allergy            N/
A            N/A                         2010                            
      

 

 Yes            amitriptyline                         Drug Allergy             
                                      2010                               
   

 

 Yes            trazodone                         Drug Allergy            N/A  
          N/A                         2011                               
   

 

 Yes            trazodone                         Drug Allergy                 
                                  2011                                  



                                          



Medications

      



There is no data.                  



Problems

      





 Date Dx Coded            Attending            Type            Code            
Diagnosis            Diagnosed By        

 

 2009                                      682.9            Cellulitis 
And Abscess Of Unspecified Sites                     

 

 2009                                      682.9            Cellulitis 
And Abscess Of Unspecified Sites                     

 

 2009                                      682.9            Cellulitis 
And Abscess Of Unspecified Sites                     

 

 2009            RAHEEM COYNE                         682.9      
      Cellulitis And Abscess Of Unspecified Sites                     

 

 2009            JEANIE BELTRÁN                         
682.9            Cellulitis And Abscess Of Unspecified Sites                   
  

 

 2009            RENETTA REGALADO                         682.9      
      Cellulitis And Abscess Of Unspecified Sites                     

 

 2009            RENETTA REGALADO                         682.9      
      Cellulitis And Abscess Of Unspecified Sites                     

 

 2009                                      728.85            MUSCLE SPASM
                     

 

 2009                                      728.85            MUSCLE SPASM
                     

 

 2009                                      728.85            MUSCLE SPASM
                     

 

 2009            RAHEEM COYNE                         728.85     
       MUSCLE SPASM                     

 

 2009            JEANIE BELTRÁN                         
728.85            MUSCLE SPASM                     

 

 2009            RENETTA REGALADO                         728.85     
       MUSCLE SPASM                     

 

 2009            RENETTA REGALADO                         728.85     
       MUSCLE SPASM                     

 

 2009                                      V25.49            Gynecologic 
Service Prescrip Of Contracept Agent - Repeat Rx                     

 

 2009                                      V72.31            Pelvic Exam (
internal)                     

 

 2009                                      V25.49            Gynecologic 
Service Prescrip Of Contracept Agent - Repeat Rx                     

 

 2009                                      V72.31            Pelvic Exam (
internal)                     

 

 2009                                      V25.49            Gynecologic 
Service Prescrip Of Contracept Agent - Repeat Rx                     

 

 2009                                      V72.31            Pelvic Exam (
internal)                     

 

 2009            RAHEEM COYNE                         V25.49     
       Gynecologic Service Prescrip Of Contracept Agent - Repeat Rx            
         

 

 2009            RAHEEM COYNE                         V72.31     
       Pelvic Exam (internal)                     

 

 2009            JEANIE BELTRÁN                         
V25.49            Gynecologic Service Prescrip Of Contracept Agent - Repeat Rx 
                    

 

 2009            JEANIE BELTRÁN                         
V72.31            Pelvic Exam (internal)                     

 

 2009            RENETTA REGALADO                         V25.49     
       Gynecologic Service Prescrip Of Contracept Agent - Repeat Rx            
         

 

 2009            RENETTA REGALADO                         V72.31     
       Pelvic Exam (internal)                     

 

 2009            RENETTA REGALADO                         V25.49     
       Gynecologic Service Prescrip Of Contracept Agent - Repeat Rx            
         

 

 2009            RENETTA REGALADO                         V72.31     
       Pelvic Exam (internal)                     

 

 2009                                      729.5            Pain In Limb 
                    

 

 2009                                      729.5            Pain In Limb 
                    

 

 2009                                      729.5            Pain In Limb 
                    

 

 2009            RAHEEM COYNE A                         729.5      
      Pain In Limb                     

 

 2009            JEANIE BELTRÁN                         
729.5            Pain In Limb                     

 

 2009            RENETTA REGALADO                         729.5      
      Pain In Limb                     

 

 2009            RENETTA REGALADO                         729.5      
      Pain In Limb                     

 

 2009                                      465.9            Acute Upper 
Respiratory Infections Of Unspecified Site                     

 

 2009                                      465.9            Acute Upper 
Respiratory Infections Of Unspecified Site                     

 

 2009                                      465.9            Acute Upper 
Respiratory Infections Of Unspecified Site                     

 

 2009            RAHEEM COYNE A                         465.9      
      Acute Upper Respiratory Infections Of Unspecified Site                   
  

 

 2009            JEANIE BELTRÁN N                         
465.9            Acute Upper Respiratory Infections Of Unspecified Site        
             

 

 2009            RENETTA REGALADO                         465.9      
      Acute Upper Respiratory Infections Of Unspecified Site                   
  

 

 2009            RENETTA REGALADO                         465.9      
      Acute Upper Respiratory Infections Of Unspecified Site                   
  

 

 2009                                      053.9            Herpes Zoster
, Without Mention Of Complication                     

 

 2009                                      053.9            Herpes Zoster
, Without Mention Of Complication                     

 

 2009                                      053.9            Herpes Zoster
, Without Mention Of Complication                     

 

 2009            RAHEEM COYNE A                         053.9      
      Herpes Zoster, Without Mention Of Complication                     

 

 2009            EJANIE BELTRÁN N                         
053.9            Herpes Zoster, Without Mention Of Complication                
     

 

 2009            RENETTA REGALADO                         053.9      
      Herpes Zoster, Without Mention Of Complication                     

 

 2009            RENETTA REGALADO                         053.9      
      Herpes Zoster, Without Mention Of Complication                     

 

 2010                                      724.2            LUMBAGO      
               

 

 2010                                      724.2            LUMBAGO      
               

 

 2010                                      724.2            LUMBAGO      
               

 

 2010            RAHEEM COYNE A                         724.2      
      LUMBAGO                     

 

 2010            JEANIE BELTRÁN N                         
724.2            LUMBAGO                     

 

 2010            RENETTA REGALADO                         724.2      
      LUMBAGO                     

 

 2010            RENETTA REGALADO                         724.2      
      LUMBAGO                     

 

 06/10/2010                                      722.10            DISPLACEMENT 
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                     

 

 06/10/2010                                      722.10            DISPLACEMENT 
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                     

 

 06/10/2010                                      722.10            DISPLACEMENT 
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                     

 

 06/10/2010            RAHEEM COYNE A                         722.10     
       DISPLACEMENT OF LUMBAR INTERVERTEBRAL DISC WITHOUT MYELOPATHY           
          

 

 06/10/2010            JEANIE BELTRÁN N                         
722.10            DISPLACEMENT OF LUMBAR INTERVERTEBRAL DISC WITHOUT MYELOPATHY
                     

 

 06/10/2010            RENETTA REGALADO                         722.10     
       DISPLACEMENT OF LUMBAR INTERVERTEBRAL DISC WITHOUT MYELOPATHY           
          

 

 06/10/2010            RENETTA REGALADO                         722.10     
       DISPLACEMENT OF LUMBAR INTERVERTEBRAL DISC WITHOUT MYELOPATHY           
          

 

 2010                                      300.00            AN ANXIETY 
UNSPEC                     

 

 2010                                      300.00            AN ANXIETY 
UNSPEC                     

 

 2010                                      300.00            AN ANXIETY 
UNSPEC                     

 

 2010            RAHEEM COYNE A                         300.00     
       AN ANXIETY UNSPEC                     

 

 2010            JEANIE BELTRÁN N                         
300.00            AN ANXIETY UNSPEC                     

 

 2010            RENETTA REGALADO                         300.00     
       AN ANXIETY UNSPEC                     

 

 2010            RENETTA REGALADO                         300.00     
       AN ANXIETY UNSPEC                     

 

 2010                                      305.1            Nondependent 
Abuse Of Drugs, Tobacco Use Disorder                     

 

 2010                                      338.29            OTHER 
CHRONIC PAIN                     

 

 2010                                      305.1            Nondependent 
Abuse Of Drugs, Tobacco Use Disorder                     

 

 2010                                      338.29            OTHER 
CHRONIC PAIN                     

 

 2010                                      305.1            Nondependent 
Abuse Of Drugs, Tobacco Use Disorder                     

 

 2010                                      338.29            OTHER 
CHRONIC PAIN                     

 

 2010            RAHEEM COYNE A                         305.1      
      Nondependent Abuse Of Drugs, Tobacco Use Disorder                     

 

 2010            RAHEEM COYNE A                         338.29     
       OTHER CHRONIC PAIN                     

 

 2010            JEANIE BELTRÁN N                         
305.1            Nondependent Abuse Of Drugs, Tobacco Use Disorder             
        

 

 2010            JEANIE BELTRÁN N                         
338.29            OTHER CHRONIC PAIN                     

 

 2010            RENETTA REGALADO                         305.1      
      Nondependent Abuse Of Drugs, Tobacco Use Disorder                     

 

 2010            RENETTA REGALADO                         338.29     
       OTHER CHRONIC PAIN                     

 

 2010            RENETTA REGALADO                         305.1      
      Nondependent Abuse Of Drugs, Tobacco Use Disorder                     

 

 2010            RENETTA REGALADO                         338.29     
       OTHER CHRONIC PAIN                     

 

 2010                                      372.30            
CONJUNCTIVITIS UNSPECIFIED                     

 

 2010                                      477.9            Rhinitis     
                

 

 2010                                      786.2            Cough        
             

 

 2010                                      372.30            
CONJUNCTIVITIS UNSPECIFIED                     

 

 2010                                      477.9            Rhinitis     
                

 

 2010                                      786.2            Cough        
             

 

 2010                                      372.30            
CONJUNCTIVITIS UNSPECIFIED                     

 

 2010                                      477.9            Rhinitis     
                

 

 2010                                      786.2            Cough        
             

 

 2010            DEISY APRN, RAHEEM A                         372.30     
       CONJUNCTIVITIS UNSPECIFIED                     

 

 2010            DEISY APRN, RAHEEM A                         477.9      
      Rhinitis                     

 

 2010            DEISY APRN, RAHEEM A                         786.2      
      Cough                     

 

 2010            LEI CASHERO APRN, JEANIE N                         
372.30            CONJUNCTIVITIS UNSPECIFIED                     

 

 2010            LEI CASHERO APRN, JEANIE N                         
477.9            Rhinitis                     

 

 2010            LEI CASHERO APRN, JEANIE N                         
786.2            Cough                     

 

 2010            SALAS APRN, RENETTA T                         372.30     
       CONJUNCTIVITIS UNSPECIFIED                     

 

 2010            SALAS APRN, RENETTA T                         477.9      
      Rhinitis                     

 

 2010            SALAS APRN, RENETTA T                         786.2      
      Cough                     

 

 2010            SALAS APRN, RENETTA T                         372.30     
       CONJUNCTIVITIS UNSPECIFIED                     

 

 2010            SALAS APRN, RENETTA T                         477.9      
      Rhinitis                     

 

 2010            SALAS APRN, RENETTA T                         786.2      
      Cough                     

 

 2010                                      380.11            ACUTE 
INFECTION OF PINNA                     

 

 2010                                      V25.40            SURVEILLANCE 
OF PREVIOUSLY PRESCRIBED CONTRACEPTIVE METHODS, UNSPECIFIED                     

 

 2010                                      380.11            ACUTE 
INFECTION OF PINNA                     

 

 2010                                      V25.40            SURVEILLANCE 
OF PREVIOUSLY PRESCRIBED CONTRACEPTIVE METHODS, UNSPECIFIED                     

 

 2010                                      380.11            ACUTE 
INFECTION OF PINNA                     

 

 2010                                      V25.40            SURVEILLANCE 
OF PREVIOUSLY PRESCRIBED CONTRACEPTIVE METHODS, UNSPECIFIED                     

 

 2010            DEISY APRN, RAHEEM A                         380.11     
       ACUTE INFECTION OF PINNA                     

 

 2010            DEISY APRN, RAHEEM A                         V25.40     
       SURVEILLANCE OF PREVIOUSLY PRESCRIBED CONTRACEPTIVE METHODS, UNSPECIFIED
                     

 

 2010            LEI CASHERO APRN, JEANIE N                         
380.11            ACUTE INFECTION OF PINNA                     

 

 2010            LEI CASHERO APRN, JEANIE N                         
V25.40            SURVEILLANCE OF PREVIOUSLY PRESCRIBED CONTRACEPTIVE METHODS, 
UNSPECIFIED                     

 

 2010            RENETTA REGALADO T                         380.11     
       ACUTE INFECTION OF PINNA                     

 

 2010            RENETTA REGALADO T                         V25.40     
       SURVEILLANCE OF PREVIOUSLY PRESCRIBED CONTRACEPTIVE METHODS, UNSPECIFIED
                     

 

 2010            RENETTA REGALADO T                         380.11     
       ACUTE INFECTION OF PINNA                     

 

 2010            RENETTA REGALADO T                         V25.40     
       SURVEILLANCE OF PREVIOUSLY PRESCRIBED CONTRACEPTIVE METHODS, UNSPECIFIED
                     

 

 10/28/2010                                      724.1            PAIN IN 
THORACIC SPINE                     

 

 10/28/2010                                      724.1            PAIN IN 
THORACIC SPINE                     

 

 10/28/2010                                      724.1            PAIN IN 
THORACIC SPINE                     

 

 10/28/2010            RAHEEM COYNE A                         724.1      
      PAIN IN THORACIC SPINE                     

 

 10/28/2010            JEANIE BELTRÁN N                         
724.1            PAIN IN THORACIC SPINE                     

 

 10/28/2010            RENETTA REGALADO                         724.1      
      PAIN IN THORACIC SPINE                     

 

 10/28/2010            RENETTA REGALADO                         724.1      
      PAIN IN THORACIC SPINE                     

 

 2010                                      709.9            UNSPECIFIED 
DISORDER OF SKIN AND SUBCUTANEOUS TISSUE                     

 

 2010                                      709.9            UNSPECIFIED 
DISORDER OF SKIN AND SUBCUTANEOUS TISSUE                     

 

 2010                                      709.9            UNSPECIFIED 
DISORDER OF SKIN AND SUBCUTANEOUS TISSUE                     

 

 2010            RAHEEM COYNE                         709.9      
      UNSPECIFIED DISORDER OF SKIN AND SUBCUTANEOUS TISSUE                     

 

 2010            JEANIE BELTRÁN N                         
709.9            UNSPECIFIED DISORDER OF SKIN AND SUBCUTANEOUS TISSUE          
           

 

 2010            RENETTA REGALADO                         709.9      
      UNSPECIFIED DISORDER OF SKIN AND SUBCUTANEOUS TISSUE                     

 

 2010            RENETTA REGALADO                         709.9      
      UNSPECIFIED DISORDER OF SKIN AND SUBCUTANEOUS TISSUE                     

 

 2011                                      720.2            SACROILIITIS 
NOT ELSEWHERE CLASSIFIED                     

 

 2011                                      729.2            NEURALGIA 
NEURITIS AND RADICULITIS UNSPECIFIED                     

 

 2011                                      720.2            SACROILIITIS 
NOT ELSEWHERE CLASSIFIED                     

 

 2011                                      729.2            NEURALGIA 
NEURITIS AND RADICULITIS UNSPECIFIED                     

 

 2011                                      720.2            SACROILIITIS 
NOT ELSEWHERE CLASSIFIED                     

 

 2011                                      729.2            NEURALGIA 
NEURITIS AND RADICULITIS UNSPECIFIED                     

 

 2011            RAHEEM COYNE A                         720.2      
      SACROILIITIS NOT ELSEWHERE CLASSIFIED                     

 

 2011            RAHEEM COYNE A                         729.2      
      NEURALGIA NEURITIS AND RADICULITIS UNSPECIFIED                     

 

 2011            JEANIE BELTRÁN N                         
720.2            SACROILIITIS NOT ELSEWHERE CLASSIFIED                     

 

 2011            JEANIE BELTRÁN N                         
729.2            NEURALGIA NEURITIS AND RADICULITIS UNSPECIFIED                
     

 

 2011            RENETTA REGALADO                         720.2      
      SACROILIITIS NOT ELSEWHERE CLASSIFIED                     

 

 2011            RENETTA REGALADO                         729.2      
      NEURALGIA NEURITIS AND RADICULITIS UNSPECIFIED                     

 

 2011            RENETTA REGALADO                         720.2      
      SACROILIITIS NOT ELSEWHERE CLASSIFIED                     

 

 2011            RENETTA REGALADO                         729.2      
      NEURALGIA NEURITIS AND RADICULITIS UNSPECIFIED                     

 

 2011                         Ot            592.1            CALCULUS OF 
URETER                     

 

 2011                         Ot            789.09            ABDOMINAL 
PAIN, OTHER SPECIFIED SITE                     

 

 2011                         Ot            789.09            ABDOMINAL 
PAIN, OTHER SPECIFIED SITE                     

 

 2011                         Ot            V13.01            PERSONAL 
HISTORY OF URINARY CALCULI                     

 

 2011                                      780.52            INSOMNIA 
UNSPECIFIED                     

 

 2011                                      780.52            INSOMNIA 
UNSPECIFIED                     

 

 2011                                      780.52            INSOMNIA 
UNSPECIFIED                     

 

 2011            RAHEEM COYNE                         780.52     
       INSOMNIA UNSPECIFIED                     

 

 2011            JEANIE BELTRÁN                         
780.52            INSOMNIA UNSPECIFIED                     

 

 2011            RENETTA REGALADO                         780.52     
       INSOMNIA UNSPECIFIED                     

 

 2011            RENETTA REGALADO                         780.52     
       INSOMNIA UNSPECIFIED                     

 

 2011                         Ot            575.11            CHRONIC 
CHOLECYSTITIS                     

 

 2011                                      300.02            AN GEN 
ANXIETY                     

 

 2011                                      300.02            AN GEN 
ANXIETY                     

 

 2011                                      300.02            AN GEN 
ANXIETY                     

 

 2011            RAHEEM COYNE                         300.02     
       AN GEN ANXIETY                     

 

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            KARISSA AUGUSTIN MD, Ot            
O14.03            MILD TO MODERATE PRE-ECLAMPSIA, THIRD TR                     

 

 2019            KARISSA AUGUSTIN MD, Ot            
O14.03            MILD TO MODERATE PRE-ECLAMPSIA, THIRD TR                     



                                                                               
                                                                               
                                                                               
                                                                               
                                                                               
                                                                               
                                                                               
                                                                               
                                                                               
                 



Procedures

      





 Code            Description            Performed By            Performed On   
     

 

             68048                                  THERAPUTIC INJ SQ/IM       
                            11/15/2012        

 

                                               DEPO-PROVERA  MG    
                               11/15/2012        

 

             85641                                  URINE PREGNANCY TEST (IN-
HOUSE)                                   11/15/2012        

 

             25686                                  THERAPUTIC INJ SQ/IM       
                            2013        

 

                                               DEPO PROVERA               
                    2013        

 

             07346                                  URINE PREGNANCY TEST (IN-
HOUSE)                                   2013        

 

             96.04                                  INSERT ENDOTRACHEAL TUBE   
                                2013        

 

             96.71                                  CONTINUOUS INVASIVE 
MECHANICAL VENTILATI                                   2013        

 

             04343                                  ROUTINE VENIPUNCTURE       
                            2014        

 

             57803                                  A1C (RML)                  
                 2014        

 

             48S31G9                                  EXTRACTION OF POC, LOW 
CERVICAL, OPEN AP                                   2016        



                                      



Results

      





 Test            Result            Range        









 Complete urinalysis with reflex to culture - 18 17:02         









 Urine color determination            YELLOW             NRG        

 

 Urine clarity determination            SLIGHTLY CLOUDY             NRG        

 

 Urine pH measurement by test strip            6             5-9        

 

 Specific gravity of urine by test strip            1.025             1.016-
1.022        

 

 Urine protein assay by test strip, semi-quantitative            2+             
NEGATIVE        

 

 Urine glucose detection by automated test strip            NEGATIVE           
  NEGATIVE        

 

 Erythrocytes detection in urine sediment by light microscopy            
NEGATIVE             NEGATIVE        

 

 Urine ketones detection by automated test strip            2+             
NEGATIVE        

 

 Urine nitrite detection by test strip            NEGATIVE             NEGATIVE
        

 

 Urine total bilirubin detection by test strip            NEGATIVE             
NEGATIVE        

 

 Urine urobilinogen measurement by automated test strip (mass/volume)          
  1 mg/dL            NORMAL        

 

 Urine leukocyte esterase detection by dipstick            3+             
NEGATIVE        

 

 Automated urine sediment erythrocyte count by microscopy (number/high power 
field)            NONE             NRG        

 

 Automated urine sediment leukocyte count by microscopy (number/high power field
)             [HPF]            NRG        

 

 Bacteria detection in urine sediment by light microscopy            MODERATE  
           NRG        

 

 Squamous epithelial cells detection in urine sediment by light microscopy     
       25-50             NRG        

 

 Crystals detection in urine sediment by light microscopy            NONE      
       NRG        

 

 Casts detection in urine sediment by light microscopy            NONE         
    NRG        

 

 Mucus detection in urine sediment by light microscopy            SMALL        
     NRG        

 

 Complete urinalysis with reflex to culture            YES             NRG     
   









 Urine beta human chorionic gonadotropin (hCG) measurement - 18 17:02    
     









 Urine beta human chorionic gonadotropin (hCG) measurement            POSITIVE 
            NEGATIVE        









 Bacterial urine culture - 18 17:02         









 Bacterial urine culture            SEE REPORT             NRG        

 

 COLONY COUNT            .             NRG        









 Chlamydia DNA amp probe, urine - 18 17:02         









 Chlamydia DNA amp probe, urine            Not Detected             Not 
Detected        









 Urine Neisseria gonorrhoeae DNA assay - 18 17:02         









 Gonorrhea amp DNA-urine            Not Detected             Not Detected      
  









 Urine protein/creatinine mass ratio - 19 11:00         









 Urine protein measurement (mass/volume)            12 mg/dL            6-12   
     

 

 Urine creatinine measurement (mass/volume)            74 mg/dL            30-
125        

 

 Urine protein/creatinine mass ratio            0.16             NRG        









 Urine protein/creatinine mass ratio - 19 10:00         









 Urine protein measurement (mass/volume)            < mg/dL            6-12    
    

 

 Urine creatinine measurement (mass/volume)            54 mg/dL            30-
125        

 

 Urine protein/creatinine mass ratio            TNP             NRG        









 Urine protein/creatinine mass ratio - 19 10:00         









 Urine protein measurement (mass/volume)            38 mg/dL            6-12   
     

 

 Urine creatinine measurement (mass/volume)            69 mg/dL            30-
125        

 

 Urine protein/creatinine mass ratio            0.55             NRG        









 Urine protein/creatinine mass ratio - 19 11:00         









 Urine protein measurement (mass/volume)            20 mg/dL            6-12   
     

 

 Urine creatinine measurement (mass/volume)            43 mg/dL            30-
125        

 

 Urine protein/creatinine mass ratio            0.47             NRG        









 Complete blood count (CBC) with automated white blood cell (WBC) differential 
- 19 15:27         









 Blood leukocytes automated count (number/volume)            9.3 10*3/uL       
     4.3-11.0        

 

 Blood erythrocytes automated count (number/volume)            3.66 10*6/uL    
        4.35-5.85        

 

 Venous blood hemoglobin measurement (mass/volume)            9.9 g/dL         
   11.5-16.0        

 

 Blood hematocrit (volume fraction)            31 %            35-52        

 

 Automated erythrocyte mean corpuscular volume            83 [foz_us]          
  80-99        

 

 Automated erythrocyte mean corpuscular hemoglobin (mass per erythrocyte)      
      27 pg            25-34        

 

 Automated erythrocyte mean corpuscular hemoglobin concentration measurement (
mass/volume)            33 g/dL            32-36        

 

 Automated erythrocyte distribution width ratio            15.0 %            
10.0-14.5        

 

 Automated blood platelet count (count/volume)            227 10*3/uL          
  130-400        

 

 Automated blood platelet mean volume measurement            9.3 [foz_us]      
      7.4-10.4        

 

 Automated blood neutrophils/100 leukocytes            64 %            42-75   
     

 

 Automated blood lymphocytes/100 leukocytes            31 %            12-44   
     

 

 Blood monocytes/100 leukocytes            5 %            0-12        

 

 Automated blood eosinophils/100 leukocytes            1 %            0-10     
   

 

 Automated blood basophils/100 leukocytes            1 %            0-10        

 

 Blood neutrophils automated count (number/volume)            5.9 10*3         
   1.8-7.8        

 

 Blood lymphocytes automated count (number/volume)            2.9 10*3         
   1.0-4.0        

 

 Blood monocytes automated count (number/volume)            0.4 10*3            
0.0-1.0        

 

 Automated eosinophil count            0.1 10*3/uL            0.0-0.3        

 

 Automated blood basophil count (count/volume)            0.1 10*3/uL          
  0.0-0.1        









 Blood type T Indirect antibody screen panel - 19 15:27         









 ABO+Rh group            OP             NRG        

 

 Transfusion band number            [id number]             NRG        

 

 Blood group antibody screen            NEGATIVE             NRG        









 Capillary blood glucose measurement by glucometer (mass/volume) - 19 16:
06         









 Capillary blood glucose measurement by glucometer (mass/volume)            241 
mg/dL                    









 Complete blood count (CBC) with automated white blood cell (WBC) differential 
- 19 06:10         









 Blood leukocytes automated count (number/volume)            9.0 10*3/uL       
     4.3-11.0        

 

 Blood erythrocytes automated count (number/volume)            3.92 10*6/uL    
        4.35-5.85        

 

 Venous blood hemoglobin measurement (mass/volume)            10.3 g/dL        
    11.5-16.0        

 

 Blood hematocrit (volume fraction)            33 %            35-52        

 

 Automated erythrocyte mean corpuscular volume            84 [foz_us]          
  80-99        

 

 Automated erythrocyte mean corpuscular hemoglobin (mass per erythrocyte)      
      26 pg            25-34        

 

 Automated erythrocyte mean corpuscular hemoglobin concentration measurement (
mass/volume)            31 g/dL            32-36        

 

 Automated erythrocyte distribution width ratio            14.9 %            
10.0-14.5        

 

 Automated blood platelet count (count/volume)            206 10*3/uL          
  130-400        

 

 Automated blood platelet mean volume measurement            9.2 [foz_us]      
      7.4-10.4        

 

 Automated blood neutrophils/100 leukocytes            66 %            42-75   
     

 

 Automated blood lymphocytes/100 leukocytes            28 %            12-44   
     

 

 Blood monocytes/100 leukocytes            5 %            0-12        

 

 Automated blood eosinophils/100 leukocytes            1 %            0-10     
   

 

 Automated blood basophils/100 leukocytes            0 %            0-10        

 

 Blood neutrophils automated count (number/volume)            5.9 10*3         
   1.8-7.8        

 

 Blood lymphocytes automated count (number/volume)            2.5 10*3         
   1.0-4.0        

 

 Blood monocytes automated count (number/volume)            0.5 10*3            
0.0-1.0        

 

 Automated eosinophil count            0.1 10*3/uL            0.0-0.3        

 

 Automated blood basophil count (count/volume)            0.0 10*3/uL          
  0.0-0.1        









 Comprehensive metabolic panel - 19 06:10         









 Serum or plasma sodium measurement (moles/volume)            137 mmol/L       
     135-145        

 

 Serum or plasma potassium measurement (moles/volume)            3.1 mmol/L    
        3.6-5.0        

 

 Serum or plasma chloride measurement (moles/volume)            108 mmol/L     
               

 

 Carbon dioxide            18 mmol/L            21-32        

 

 Serum or plasma anion gap determination (moles/volume)            11 mmol/L   
         5-14        

 

 Serum or plasma urea nitrogen measurement (mass/volume)            3 mg/dL    
        7-18        

 

 Serum or plasma creatinine measurement (mass/volume)            0.86 mg/dL    
        0.60-1.30        

 

 Serum or plasma urea nitrogen/creatinine mass ratio            3             
NRG        

 

 Serum or plasma creatinine measurement with calculation of estimated 
glomerular filtration rate            >             NRG        

 

 Serum or plasma glucose measurement (mass/volume)            111 mg/dL        
            

 

 Serum or plasma calcium measurement (mass/volume)            7.8 mg/dL        
    8.5-10.1        

 

 Serum or plasma total bilirubin measurement (mass/volume)            0.2 mg/dL
            0.1-1.0        

 

 Serum or plasma alkaline phosphatase measurement (enzymatic activity/volume)  
          126 U/L                    

 

 Serum or plasma aspartate aminotransferase measurement (enzymatic activity/
volume)            91 U/L            5-34        

 

 Serum or plasma alanine aminotransferase measurement (enzymatic activity/volume
)            134 U/L            0-55        

 

 Serum or plasma protein measurement (mass/volume)            5.7 g/dL         
   6.4-8.2        

 

 Serum or plasma albumin measurement (mass/volume)            2.8 g/dL         
   3.2-4.5        

 

 CALCIUM CORRECTED            8.8 mg/dL            8.5-10.1        









 Lactate dehydrogenase 1 [enzymatic activity/volume] in serum or plasma -  06:10         









 Lactate dehydrogenase 1 [enzymatic activity/volume] in serum or plasma        
    356 U/L            125-220        



                                            



Encounters

      





 ACCT No.            Visit Date/Time            Discharge            Status    
        Pt. Type            Provider            Facility            Loc./Unit  
          Complaint        

 

 [id number]            2014 09:13:00            2014 23:59:59          
  CLS            Outpatient            RENETTA REGALADO                    
                           

 

 [id number]            2014 09:06:00            2014 23:59:59          
  CLS            Outpatient            RENETTA REGALADO                    
                           

 

 [id number]            2014 13:57:00            2014 23:59:59          
  CLS            Outpatient            JEANIE BELTRÁN           
                                    

 

 [id number]            2013 15:51:00            2013 23:59:59          
  CLS            Outpatient                                                    
        

 

 [id number]            2013 15:51:00            2013 23:59:59          
  CLS            Outpatient            RAHEEM COYNE                    
                           

 

 [id number]            11/15/2012 09:48:00            11/15/2012 23:59:59            
CLS            Outpatient                                                      
      

 

 [id number]            11/15/2012 09:48:00            11/15/2012 23:59:59          
  CLS            Outpatient                                                    
        

 

 [id number]            2019 10:00:00            2019 23:59:59    
        CLS            Outpatient            KARISSA AUGUSTIN MD         
   [facility]                     

 

 [id number]            2019 10:00:00            2019 23:59:59    
        CLS            Outpatient            KARISSA AUGUSTIN MD         
   [facility]                     

 

 [id number]            2019 11:00:00            2019 23:59:59    
        CLS            Outpatient            KARISSA AUGUSTIN MD         
   [facility]                     

 

 [id number]            2019 18:44:00            2019 23:20:00    
        DIS            Outpatient            KARISSA AUGUSTIN MD         
   [facility]            WSo            FALL,STOMACH PAIN
        

 

 [id number]            2018 16:40:00            2018 18:23:00    
        DIS            Emergency            TIEN RENDON MD            [facility] 
[facility]            ER            JUST FOUND OUT SHES 
PREGNANT, CRAMPING,        

 

 [id number]            2016 06:33:00            2016 17:10:00    
        DIS            Inpatient            SEAN MONTEJO DO            [facility]                    

 

 [id number]            2016 14:48:00            2016 18:05:00    
        DIS            Outpatient            SHRESTHA DO, MIRIAM K            [facility]            PRESSURE/CONTRACTIONS 34 
WKS PREG        

 

 [id number]            2016 10:23:00            2016 13:25:00    
        DIS            Outpatient            JOSSUE CH, LUCILLE YOST            [facility]            CRAMPING        

 

 [id number]            2013 02:15:00            2013 10:35:00    
        DIS            Inpatient            DOROTHY CH, RADHA SALOMON            [facility] 
[facility]            ICU            HEAD TRAUMA,AMS        

 

 [id number]            2013 15:04:00            2013 16:45:00    
        DIS            Emergency            JENNIFER DESOUZA            
[facility]            ER            POSS ABSCESS        

 

 [id number]            2019 14:53:00                         ACT       
     Inpatient            KARISSA AUGUSTIN MD            [facility]            WS            CSECTION        

 

 [id number]            2019 11:00:00                         ACT       
     Outpatient            KARISSA AUGUSTIN MD            [facility]            LABNPT                     

 

 [id number]            2013 11:05:00                                   
   Document Registration                                                       
     

 

 [id number]            2013 09:36:00                                   
   Document Registration                                                       
     

 

 [id number]            2012 07:02:00                                   
   Document Registration                                                       
     

 

 [id number]            2011 11:39:00                                   
   Document Registration                                                       
     

 

 [id number]            2011 12:52:00                                   
   Document Registration                                                       
     

 

 [id number]            2011 05:37:00                                   
   Document Registration                                                       
     

 

 [id number]            2011 09:16:00                                   
   Document Registration                                                       
     

 

 [id number]            2011 09:43:00                                   
   Document Registration                                                       
     

 

 [id number]            2011 11:31:00                                   
   Document Registration                                                       
     

 

 [id number]            2011 10:47:00                                   
   Document Registration                                                       
     

 

 [id number]            2011 13:15:00                                   
   Document Registration

## 2019-03-03 NOTE — NUR
Discharged to home.  Ambulates self downstairs with belongings in hand and to private 
vehicle.  accompanied by staff member

## 2019-03-03 NOTE — PROGRESS NOTE-STANDARD
Standard Progress Note


Progress Notes/Assess & Plan


Date Seen by a Provider:  Mar 3, 2019


Time Seen by a Provider:  09:22


Progress/Assessment & Plan


This patient is without complaint.  She has good pain control.  Patient denies 

nausea vomiting.  She is tolerating oral intake well.








Vital Signs








  Date Time  Temp Pulse Resp B/P (MAP) Pulse Ox O2 Delivery O2 Flow Rate FiO2


 


3/1/19 06:00  65 18     


 


3/1/19 05:00  66 18     


 


3/1/19 04:00 97.3 63 18 121/76 (91) 100 Room Air  


 


3/1/19 04:00  64 18     


 


3/1/19 03:00  64 18     


 


3/1/19 02:04     100 Room Air  


 


3/1/19 02:03  62 18     


 


3/1/19 01:00  56 18     


 


3/1/19 00:00 97.1 64 18 133/87 (102) 100 Room Air  


 


3/1/19 00:00  64 18     


 


19 23:00  69 18     


 


19 22:00  65 18     


 


19 20:10  63 18     


 


19 20:10 96.5  18 169/107 (127) 100 Room Air  


 


19 19:20  65 18     


 


19 18:50    117/86 (96)    


 


19 18:43 97.0  16 172/102 (125) 98 Room Air  


 


19 18:33 96.4 62 16 155/100 (118) 100 Room Air  


 


19 18:18 96.9 70 16 151/134 (140) 98 Room Air  


 


19 18:18  70 16     


 


19 18:01 97.1 85 16 142/96 (111) 98 Room Air  


 


19 17:48 97.2 77 16 119/89 (99) 100 Room Air  


 


19 16:35  90 18 159/103 (121)  Room Air  


 


19 16:20  86 18 153/100 (117)  Room Air  


 


19 16:05  91 18 132/82 (99)  Room Air  


 


19 15:50  93 18 146/92 (110)  Room Air  


 


19 15:11 98.1 98 18 151/96 (114)  Room Air  














I & O 


 


 3/1/19





 07:00


 


Intake Total 6050 ml


 


Output Total 7700 ml


 


Balance -1650 ml





Blood pressures have been somewhat labile but have responded to IV and by mouth 

labetalol and are acceptable at this point








Laboratory Tests








Test


 19


15:27 19


16:06 3/1/19


06:10 Range/Units


 


 


White Blood Count


 9.3 


 


 9.0 


 4.3-11.0


10^3/uL


 


Red Blood Count


 3.66 L


 


 3.92 L


 4.35-5.85


10^6/uL


 


Hemoglobin 9.9 L  10.3 L 11.5-16.0  G/DL


 


Hematocrit 31 L  33 L 35-52  %


 


Mean Corpuscular Volume 83   84  80-99  FL


 


Mean Corpuscular Hemoglobin 27   26  25-34  PG


 


Mean Corpuscular Hemoglobin


Concent 33 


 


 31 L


 32-36  G/DL





 


Red Cell Distribution Width 15.0 H  14.9 H 10.0-14.5  %


 


Platelet Count


 227 


 


 206 


 130-400


10^3/uL


 


Mean Platelet Volume 9.3   9.2  7.4-10.4  FL


 


Neutrophils (%) (Auto) 64   66  42-75  %


 


Lymphocytes (%) (Auto) 31   28  12-44  %


 


Monocytes (%) (Auto) 5   5  0-12  %


 


Eosinophils (%) (Auto) 1   1  0-10  %


 


Basophils (%) (Auto) 1   0  0-10  %


 


Neutrophils # (Auto) 5.9   5.9  1.8-7.8  X 10^3


 


Lymphocytes # (Auto) 2.9   2.5  1.0-4.0  X 10^3


 


Monocytes # (Auto) 0.4   0.5  0.0-1.0  X 10^3


 


Eosinophils # (Auto)


 0.1 


 


 0.1 


 0.0-0.3


10^3/uL


 


Basophils # (Auto)


 0.1 


 


 0.0 


 0.0-0.1


10^3/uL


 


Glucometer  241 H    MG/DL


 


Sodium Level   137  135-145  MMOL/L


 


Potassium Level   3.1 L 3.6-5.0  MMOL/L


 


Chloride Level   108 H   MMOL/L


 


Carbon Dioxide Level   18 L 21-32  MMOL/L


 


Anion Gap   11  5-14  MMOL/L


 


Blood Urea Nitrogen   3 L 7-18  MG/DL


 


Creatinine


 


 


 0.86 


 0.60-1.30


MG/DL


 


Estimat Glomerular Filtration


Rate 


 


 > 60 


  





 


BUN/Creatinine Ratio   3   


 


Glucose Level   111 H   MG/DL


 


Calcium Level   7.8 L 8.5-10.1  MG/DL


 


Corrected Calcium   8.8  8.5-10.1  MG/DL


 


Total Bilirubin   0.2  0.1-1.0  MG/DL


 


Aspartate Amino Transf


(AST/SGOT) 


 


 91 H


 5-34  U/L





 


Alanine Aminotransferase


(ALT/SGPT) 


 


 134 H


 0-55  U/L





 


Alkaline Phosphatase   126    U/L


 


Lactate Dehydrogenase   356 H 125-220  U/L


 


Total Protein   5.7 L 6.4-8.2  GM/DL


 


Albumin   2.8 L 3.2-4.5  GM/DL





Liver enzymes remain slightly elevated.  The LDH this is elevated as well.  

These are in the same range as the preadmission labs yesterday.





Physical exam shows a benign abdomen.


Extremities show no clubbing or cyanosis.  There is no Homans sign.





Assessment and plan   postoperative day number 1 status post repeat  

delivery at 36 weeks' gestation due to severe preeclampsia in a patient who is 

diabetic and has had prior C-sections 5.  It appears preeclampsia is resolving 

this point we will call her magnesium and her IV fluids and remove the Gunter 

catheter and allowing ambulation.  Continue the labetalol 100 mg twice a day in 

the short-term and a close attention to her blood pressures and other vitals








2019


Patient is without complaint.  She is ablating, voiding, tolerating oral intake 

well has good control of her pain, patient denies chest pain, denies shortness 

of breath, denies headache, denies nausea vomiting.








Vital Signs








  Date Time  Temp Pulse Resp B/P (MAP) Pulse Ox O2 Delivery O2 Flow Rate FiO2


 


3/2/19 05:51 98.6 80 18 130/80 (97) 100 Room Air  


 


3/2/19 02:30 98.3 80 18 133/81 (98) 100 Room Air  


 


3/1/19 21:30 98.6 77 19 160/95 (116) 100 Room Air  


 


3/1/19 15:37 96.8 65 19 158/107 (124) 100 Room Air  


 


3/1/19 11:14 96.9 58 19 129/88 (102) 100 Room Air  


 


3/1/19 09:12 97.0       














I & O 


 


 3/2/19





 07:00


 


Intake Total 5815 ml


 


Output Total 7000 ml


 


Balance -1185 ml





Blood pressures are generally stable and are decreasing to a more normal range 

with the labetalol





The abdomen is benign.  Surgical incision is clean dry and intact.


Extremities show no clubbing or cyanosis.  There is no Homans sign.





Assessment and plan   postoperative day number 2 status post repeat  

delivery due to severe preeclampsia.  Patient doing well and we will entertain 

discharge home today or tomorrow as she prefers





March 3, 2019


Patient without complaint.  She is ablating, voiding, tolerating oral intake 

has good pain control.  She is requesting discharge home.








Vital Signs








  Date Time  Temp Pulse Resp B/P (MAP) Pulse Ox O2 Delivery O2 Flow Rate FiO2


 


3/3/19 07:46 97.6 85 18 136/71 (92) 98 Room Air  


 


3/3/19 02:00 98.3 85 18 135/84 (101) 99 Room Air  


 


3/2/19 20:00 98.7 92 18 147/94 (111) 99 Room Air  


 


3/2/19 13:45 97.3 88 18 127/79 (95) 99 Room Air  














I & O 


 


 3/3/19





 07:00


 


Intake Total 2550 ml


 


Output Total 4150 ml


 


Balance -1600 ml








Vital Signs








 3/3/19





 07:46


 


Temp 97.6


 


Pulse 85


 


Resp 18


 


B/P (MAP) 136/71 (92)


 


Pulse Ox 98


 


O2 Delivery Room Air





Vital signs are stable.  Patient is afebrile.  Her blood pressures are well 

controlled with labetalol.





The abdomen is benign.  The surgical incision is clean dry and intact.


Extremities show clubbing cyanosis.  There is no Homans sign.  There is some 

pretibial pitting edema that is within normal limits.





Assessment and plan   postoperative day number 3 status post repeat  

delivery at 36 weeks gestation due to severe preeclampsia.  Baby is doing well 

at the NICU at [facility].  Will allow patient discharge home with follow-

up in clinic


Final Diagnosis


36 week repeat  due to severe preeclampsia











KARISSA AUGUSTIN MD Mar 3, 2019 09:23

## 2019-03-03 NOTE — DISCHARGE SUMMARY
Discharge Summary


36 week repeat  due to severe preeclampsia in a patient with 

gestational diabetes


This patient is a 38-year-old  white female admitted on 2018 

at 36 weeks gestation due to severe preeclampsia.  Her pregnancy was 

complicated by gestational diabetes.  She was taken to the operating room on 

 for repeat  delivery that was performed without event.. Her 

baby required transfer to a NICU shortly after delivery.  And has been stable 

and improving at the NICU at [facility].





Postoperative day number 1 the patient was without complaint.  She did have 

been maintained on magnesium through the night post surgical day.  By 

postoperative day number 1 her blood pressures have improved her lab work was 

improving and the magnesium was discontinued.  She remains stable.





On postoperative day number 2 patient's blood pressures were well controlled 

with labetalol 100 mg twice a day which was continued.  She ambulated voided a 

good pain control.





On postoperative day number 3 patient is ablating, voiding, tolerating 

omentectomy well has good pain control.  Her blood pressures are stable and 

improving and are acceptable.  It is determined she can be discharged home.





Principal diagnoses this hospitalization is 36 week repeat .





Secondary diagnoses include previous C-sections 5, severe preeclampsia, 

gestational diabetes





Operation procedures include fetal monitoring, IV fluids, spinal analgesia, 

repeat  delivery, IV magnesium





Discharge medications are Percocet and Colace and labetalol 100 mg twice a day





Patient given appropriate discharge instructions verbally and writing.





Clinical Quality Measures


DVT/VTE Risk/Contraindication:


Risk Factor Score Per Nursin


RFS Level Per Nursing on Admit:  2=Moderate











KARISSA AUGUSTIN MD Mar 3, 2019 09:27

## 2019-12-20 ENCOUNTER — HOSPITAL ENCOUNTER (EMERGENCY)
Dept: HOSPITAL 75 - ER | Age: 39
Discharge: HOME | End: 2019-12-20
Payer: MEDICAID

## 2019-12-20 VITALS — DIASTOLIC BLOOD PRESSURE: 66 MMHG | SYSTOLIC BLOOD PRESSURE: 105 MMHG

## 2019-12-20 VITALS — HEIGHT: 61.81 IN | WEIGHT: 143.3 LBS | BODY MASS INDEX: 26.37 KG/M2

## 2019-12-20 DIAGNOSIS — F17.210: ICD-10-CM
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
## 2019-12-20 DIAGNOSIS — Z88.6: ICD-10-CM

## 2019-12-20 DIAGNOSIS — Z82.49: ICD-10-CM

## 2019-12-20 DIAGNOSIS — S60.222A: Primary | ICD-10-CM

## 2019-12-20 PROCEDURE — 73130 X-RAY EXAM OF HAND: CPT

## 2019-12-20 NOTE — ED UPPER EXTREMITY
General


Chief Complaint:  Upper Extremity


Stated Complaint:  L ARM AND HAND PAIN/SWELLING


Nursing Triage Note:  


patient states approx 1 mo ago broke thumb L hand. states now feels stiff, 


swollen fingers and hand. unable to make a fist. swelling started today at 1600


Nursing Sepsis Screen:  No Definite Risk


Source:  patient


Exam Limitations:  no limitations





History of Present Illness


Date Seen by Provider:  Dec 20, 2019


Time Seen by Provider:  21:04


Initial Comments


39-year-old female patient presents with complaints of left hand pain 1 month. 

Patient reports left thumb fracture 1 month ago.  Also states 2 weeks ago she 

smashed the left hand causing increased pain.  Today at 1600 she noticed 

swelling.  Reports pain is in the left fifth finger.  Denies taking Tylenol at 

home.


Onset:  other (one month onset)


Pain/Injury Location:  left hand, left 5th finger


Method of Injury:  direct blow (2 weeks ago and one month)


Modifying Factors:  Improves With Immobilization; Worse With Movement





Allergies and Home Medications


Allergies


Coded Allergies:  


     ibuprofen (Unverified  Allergy, Mild, 09)





Home Medications


Docusate Sodium 100 Mg Capsule, 100 MG PO BID


   Prescribed by: KARISSA FERREIRA on 3/2/19 0858


Ferrous Sulfate 325 Mg Tablet, 325 MG PO TIDWM


   Prescribed by: SEAN MONTEJO on 16 1525


Labetalol HCl 200 Mg Tablet, 100 MG PO BID


   Prescribed by: KARISSA FERREIRA on 3/2/19 0858


Oxycodone HCl/Acetaminophen 1 Each Tablet, 1 TAB PO Q4HR PRN for PAIN-MODERATE 

TO SEVERE


   Prescribed by: KARISSA FERREIRA on 3/2/19 0858


Uly774/Iron Fumarate/FA/Dss 1 Each Tablet, 1 EACH PO DAILY, (Reported)





Patient Home Medication List


Home Medication List Reviewed:  Yes





Review of Systems


Constitutional:  no symptoms reported


Respiratory:  no symptoms reported


Cardiovascular:  no symptoms reported


Musculoskeletal:  see HPI, joint pain (left hand), joint swelling (left-hand)


Skin:  No change in color, No lesions, No lumps


Psychiatric/Neurological:  Denies Numbness, Denies Paresthesia, Denies Tingling,

Denies Weakness





All Other Systems Reviewed


Negative Unless Noted:  Yes (Negative excepted noted.)





Past Medical-Social-Family Hx


Past Med/Social Hx:  Reviewed Nursing Past Med/Soc Hx


Patient Social History


Alcohol Use:  Denies Use


Recreational Drug Use:  No


Smoking Status:  Current Everyday Smoker


Type Used:  Cigarettes


2nd Hand Smoke Exposure:  Yes


Recent Foreign Travel:  No


Contact w/Someone Who Travel:  No


Recent Infectious Disease Expo:  No


Recent Hopitalizations:  No


Physical Abuse:  No


Sexual Abuse:  No


Mistreated:  No


Fear:  No





Immunizations Up To Date


Tetanus Booster (TDap):  Unknown


Date of Influenza Vaccine:  Oct 4, 2018





Seasonal Allergies


Seasonal Allergies:  No





Past Medical History


Surgeries:  Yes ( X 4)


Respiratory:  No


Cardiac:  Yes (with current pregnancy)


Neurological:  No


Sexually Transmitted Disease:  No


HIV/AIDS:  No


Genitourinary:  No


Gastrointestinal:  No


Musculoskeletal:  Yes


Chronic Back Pain


Endocrine:  Yes (GDM with current pregnancy)


HEENT:  No


Cancer:  No


Psychosocial:  No


Integumentary:  No


Blood Disorders:  No





Family Medical History


Reviewed Nursing Family Hx





Diabetes mellitus


  19 MOTHER


  G8 SISTER


Hypertension (mother)


Paraplegia


  19 FATHER


No Pertinent Family Hx





Physical Exam


Vital Signs





Vital Signs - First Documented








 19





 19:44


 


Temp 36.8


 


Pulse 89


 


Resp 18


 


B/P (MAP) 109/72 (84)


 


Pulse Ox 100


 


O2 Delivery Room Air





Capillary Refill : Less Than 3 Seconds


Height, Weight, BMI


Height: 4'11.00"


Weight: 176lbs. 0.8oz. 79.390322ph; 26.00 BMI


Method:Stated


General Appearance:  WD/WN, no apparent distress


Cardiovascular:  normal peripheral pulses, regular rate, rhythm, no murmur


Respiratory:  lungs clear, normal breath sounds, no respiratory distress, no 

accessory muscle use


Shoulder:  normal inspection, non-tender, no evidence of injury, normal ROM


Elbow/Forearm:  normal inspection, non-tender, no evidence of injury, normal 

ROM, Left


Wrist:  Yes normal inspection, Yes non-tender, Yes no evidence of injury, Yes 

normal ROM


Hand:  normal inspection (unable to appreciate swelling on exam), no evidence of

injury, Left, bone tenderness (fifth metacarpal), limited ROM, soft tissue 

tenderness


Neurologic/Tendon:  normal sensation, normal motor functions, normal tendon 

functions, responds to pain, no evidence tendon injury


Neurologic/Psychiatric:  alert, normal mood/affect, oriented x 3


Skin:  normal color, warm/dry; No ecchymosis





Progress/Results/Core Measures


Results/Orders


My Orders





Orders - JENNIFER LOWE


Hand, Left, 3 Views (19 20:40)


Tramadol Tablet (Ultram Tablet) (19 21:32)





Vital Signs/I&O











 19





 19:44 21:43 21:46


 


Temp 36.8 36.8 36.5


 


Pulse 89  81


 


Resp 18  18


 


B/P (MAP) 109/72 (84)  105/66 (84)


 


Pulse Ox 100  99


 


O2 Delivery Room Air  Room Air














Blood Pressure Mean:                    84











Diagnostic Imaging





   Diagonstic Imaging:  Xray


   Plain Films/CT/US/NM/MRI:  hand


Comments


Date of Exam:19 HAND, LEFT, 3 VIEWS INDICATION: Crush injury, swelling and

pain between the 4th and 5th metacarpals. FINDINGS: No acute appearing 

abnormality, in particular at the level of the 4th and 5th metacarpals no focal 

abnormality is identified. There is no dislocation. No suspicious foreign body. 

No gas or abnormal periosteal reaction. IMPRESSION: No acute appearing 

abnormality Dictated on workstation # [id number]


   Reviewed:  Reviewed by Me (radiology report reviewed by me)





Departure


Communication (Admissions)


Patient and evaluated.  X-rays obtained which were negative.  Findings discussed

with the patient.  Plan for discharge to home.  Patient was placed in a aluminum

colle's splint.





Impression





   Primary Impression:  


   Contusion of hand


   Qualified Codes:  S60.222A - Contusion of left hand, initial encounter


Disposition:   HOME, SELF-CARE


Condition:  Improved





Departure-Patient Inst.


Decision time for Depature:  21:16


Referrals:  


[facility] (PCP/Family)


Primary Care Physician


Patient Instructions:  Hand Pain (DC)





Add. Discharge Instructions:  


All discharge instructions reviewed with patient and/or family. Voiced 

understanding.  Tylenol over the counter as directed for pain.  Elevate the hand

on pillows.  Ice pack for 20 minute intervals as needed.  Activity as tolerated.

 You may use the hand splint as needed for pain relief.  Follow-up with your 

family practitioner next week.  Call Monday morning for appointment.  Return to 

the emergency department for worsened symptoms or any other concerns.











JENNIFER LOWE           Dec 20, 2019 21:04

## 2019-12-20 NOTE — DIAGNOSTIC IMAGING REPORT
INDICATION: Crush injury, swelling and pain between the 4th and

5th metacarpals.



FINDINGS:



No acute appearing abnormality, in particular at the level of the

4th and 5th metacarpals no focal abnormality is identified. There

is no dislocation. No suspicious foreign body. No gas or abnormal

periosteal reaction.



IMPRESSION:



No acute appearing abnormality



Dictated by: 



  Dictated on workstation # [id number]

## 2020-01-13 ENCOUNTER — HOSPITAL ENCOUNTER (EMERGENCY)
Dept: HOSPITAL 75 - ER | Age: 40
Discharge: HOME | End: 2020-01-13
Payer: MEDICAID

## 2020-01-13 VITALS — DIASTOLIC BLOOD PRESSURE: 75 MMHG | SYSTOLIC BLOOD PRESSURE: 122 MMHG

## 2020-01-13 VITALS — HEIGHT: 58.98 IN | BODY MASS INDEX: 28.36 KG/M2 | WEIGHT: 140.65 LBS

## 2020-01-13 DIAGNOSIS — O99.331: ICD-10-CM

## 2020-01-13 DIAGNOSIS — Z82.49: ICD-10-CM

## 2020-01-13 DIAGNOSIS — R10.2: ICD-10-CM

## 2020-01-13 DIAGNOSIS — F17.210: ICD-10-CM

## 2020-01-13 DIAGNOSIS — Z86.32: ICD-10-CM

## 2020-01-13 DIAGNOSIS — Z3A.10: ICD-10-CM

## 2020-01-13 DIAGNOSIS — Z88.6: ICD-10-CM

## 2020-01-13 DIAGNOSIS — O16.1: ICD-10-CM

## 2020-01-13 DIAGNOSIS — O26.891: Primary | ICD-10-CM

## 2020-01-13 LAB
ALBUMIN SERPL-MCNC: 3.9 GM/DL (ref 3.2–4.5)
ALP SERPL-CCNC: 50 U/L (ref 40–136)
ALT SERPL-CCNC: 21 U/L (ref 0–55)
APTT PPP: YELLOW S
BACTERIA #/AREA URNS HPF: (no result) /HPF
BASOPHILS # BLD AUTO: 0 10^3/UL (ref 0–0.1)
BASOPHILS NFR BLD AUTO: 0 % (ref 0–10)
BILIRUB SERPL-MCNC: 0.2 MG/DL (ref 0.1–1)
BILIRUB UR QL STRIP: NEGATIVE
BUN/CREAT SERPL: 11
CALCIUM SERPL-MCNC: 9.2 MG/DL (ref 8.5–10.1)
CHLORIDE SERPL-SCNC: 104 MMOL/L (ref 98–107)
CO2 SERPL-SCNC: 24 MMOL/L (ref 21–32)
CREAT SERPL-MCNC: 0.7 MG/DL (ref 0.6–1.3)
EOSINOPHIL # BLD AUTO: 0.2 10^3/UL (ref 0–0.3)
EOSINOPHIL NFR BLD AUTO: 2 % (ref 0–10)
ERYTHROCYTE [DISTWIDTH] IN BLOOD BY AUTOMATED COUNT: 13.8 % (ref 10–14.5)
FIBRINOGEN PPP-MCNC: CLEAR MG/DL
GFR SERPLBLD BASED ON 1.73 SQ M-ARVRAT: > 60 ML/MIN
GLUCOSE SERPL-MCNC: 86 MG/DL (ref 70–105)
GLUCOSE UR STRIP-MCNC: NEGATIVE MG/DL
HCT VFR BLD CALC: 32 % (ref 35–52)
HGB BLD-MCNC: 10.6 G/DL (ref 11.5–16)
KETONES UR QL STRIP: NEGATIVE
LEUKOCYTE ESTERASE UR QL STRIP: (no result)
LYMPHOCYTES # BLD AUTO: 2.3 X 10^3 (ref 1–4)
LYMPHOCYTES NFR BLD AUTO: 30 % (ref 12–44)
MANUAL DIFFERENTIAL PERFORMED BLD QL: NO
MCH RBC QN AUTO: 31 PG (ref 25–34)
MCHC RBC AUTO-ENTMCNC: 34 G/DL (ref 32–36)
MCV RBC AUTO: 91 FL (ref 80–99)
MONOCYTES # BLD AUTO: 0.5 X 10^3 (ref 0–1)
MONOCYTES NFR BLD AUTO: 7 % (ref 0–12)
NEUTROPHILS # BLD AUTO: 4.6 X 10^3 (ref 1.8–7.8)
NEUTROPHILS NFR BLD AUTO: 61 % (ref 42–75)
NITRITE UR QL STRIP: NEGATIVE
PH UR STRIP: 7 [PH] (ref 5–9)
PLATELET # BLD: 382 10^3/UL (ref 130–400)
PMV BLD AUTO: 7.9 FL (ref 7.4–10.4)
POTASSIUM SERPL-SCNC: 4.2 MMOL/L (ref 3.6–5)
PROT SERPL-MCNC: 7.1 GM/DL (ref 6.4–8.2)
PROT UR QL STRIP: NEGATIVE
RBC #/AREA URNS HPF: (no result) /HPF
SODIUM SERPL-SCNC: 136 MMOL/L (ref 135–145)
SP GR UR STRIP: 1.01 (ref 1.02–1.02)
WBC # BLD AUTO: 7.6 10^3/UL (ref 4.3–11)
WBC #/AREA URNS HPF: (no result) /HPF

## 2020-01-13 PROCEDURE — 84702 CHORIONIC GONADOTROPIN TEST: CPT

## 2020-01-13 PROCEDURE — 81000 URINALYSIS NONAUTO W/SCOPE: CPT

## 2020-01-13 PROCEDURE — 80053 COMPREHEN METABOLIC PANEL: CPT

## 2020-01-13 PROCEDURE — 36415 COLL VENOUS BLD VENIPUNCTURE: CPT

## 2020-01-13 PROCEDURE — 96374 THER/PROPH/DIAG INJ IV PUSH: CPT

## 2020-01-13 PROCEDURE — 86141 C-REACTIVE PROTEIN HS: CPT

## 2020-01-13 PROCEDURE — 85025 COMPLETE CBC W/AUTO DIFF WBC: CPT

## 2020-01-13 NOTE — ED ABDOMINAL PAIN
General


Chief Complaint:  Abdominal/GI Problems


Stated Complaint:  10 WKS PREG/CRAMPING


Nursing Triage Note:  


Pt to ED with c/o abdominal pain that begins on the L side radiating across 


front of abdomen and into back.  Pt describes pain as cramping.  Pain has 


persisted off and on for over a week, but has worsened today.  Pt reports being 


10+ weeks pregnant and reports positive testing that baby has Down Syndroms.  Pt




has not called OB (Dr. Bruce).


Sepsis Screen:  No Definite Risk


 (CRISTINA PEARSON MEDICAL STUDENT)


Source of Information:  Patient


Exam Limitations:  No Limitations


 (SARA CADET MD)


History of Present Illness


Initial Comments


Ms. Pierce is a   39 year old female who presents to the ED via private 

vehicle for abdominal pain. 





Patient reports that she has had severe, cramping, lower abdominal pain since 

2pm today. She has experienced episodes of this abdominal pain over the last 

week, but they have remitted within 2 hours. This episode came on suddenly and 

increased in severity over the last ~4 hours. She now rates her pain as a 10/10 

in severity. It is located in both lower quadrants with radiation to the back. 

She describes it as sharp & cramping. She has associated nausea, but no 

vomiting. She reports increased urinary frequency since her current pregnancy 

began, but no burning or dysuria. She denies any diarrhea or constipation, her 

last bowel movement was this morning and well-formed. 





Patient is currently pregnant, 10 weeks & 3 days confirmed via u/s per patient. 

Past pregnancies have been complicated by HTN, PreE, & Gestational diabetes. She

reports that all of her children were , delivered via c/s x6. Patient has

a history of Chlamydia that was treated when she was 19. She is currently 

sexually active with her  in a monogamous relationship.


 (CRISTINA PEARSON MEDICAL STUDENT)


Date Seen by Provider:  2020


Time Seen by Provider:  18:09


 (SARA CADET MD)





Allergies and Home Medications


Allergies


Coded Allergies:  


     ibuprofen (Unverified  Allergy, Mild, 09)





Home Medications


Docusate Sodium 100 Mg Capsule, 100 MG PO BID


   Prescribed by: KARISSA FERREIRA on 3/2/19 0887


Ferrous Sulfate 325 Mg Tablet, 325 MG PO TIDWM


   Prescribed by: SEAN MONTEJO on 16 1525


Ketorolac Tromethamine 10 Mg Tablet, 10 MG PO Q6H PRN for PAIN-MODERATE (5-7)


   Prescribed by: SARA CAMARILLO on 20


Labetalol HCl 200 Mg Tablet, 100 MG PO BID


   Prescribed by: KARISSA FERREIRA on 3/2/19 0858


Oxycodone HCl/Acetaminophen 1 Each Tablet, 1 TAB PO Q4HR PRN for PAIN-MODERATE 

TO SEVERE


   Prescribed by: KARISSA FERREIRA on 3/2/19 0858


Set111/Iron Fumarate/FA/Dss 1 Each Tablet, 1 EACH PO DAILY, (Reported)





Patient Home Medication List


Home Medication List Reviewed:  Yes


 (SARA CADET MD)





Review of Systems


Review of Systems


Constitutional:  no symptoms reported, see HPI


EENTM:  No Symptoms Reported


Respiratory:  No Symptoms Reported


Cardiovascular:  No Symptoms Reported


Gastrointestinal:  Abdominal Pain, Nausea


Genitourinary:  See HPI


Musculoskeletal:  no symptoms reported


Skin:  no symptoms reported


Psychiatric/Neurological:  No Symptoms Reported


Endocrine:  No Symptoms Reported


Hematologic/Lymphatic:  No Symptoms Reported (CRISTINA PEARSON MEDICAL STUDENT)





All Other Systems Reviewed


Negative Unless Noted:  Yes


 (CRISTINA PEARSON MEDICAL STUDENT)





Past Medical-Social-Family Hx


Patient Social History


Alcohol Use:  Denies Use


Recreational Drug Use:  No


Smoking Status:  Current Everyday Smoker


Type Used:  Cigarettes


2nd Hand Smoke Exposure:  Yes


Recent Foreign Travel:  No


Contact w/Someone Who Travel:  No


Recent Infectious Disease Expo:  No


Recent Hopitalizations:  No


 (CRISTINA PEARSON MEDICAL STUDENT)





Immunizations Up To Date


Tetanus Booster (TDap):  Unknown


Date of Influenza Vaccine:  Oct 4, 2018


 (CRISTINA PEARSON MEDICAL STUDENT)





Seasonal Allergies


Seasonal Allergies:  No


 (CRISTINA PEARSON MEDICAL STUDENT)





Past Medical History


Surgeries:  Yes ( X 4)


 Section, Gallbladder


Respiratory:  No


Cardiac:  Yes (with last pregnancy)


Hypertension


Neurological:  No


Sexually Transmitted Disease:  No


HIV/AIDS:  No


Genitourinary:  No


Gastrointestinal:  No


Musculoskeletal:  Yes


Chronic Back Pain


Endocrine:  Yes (GDM with last pregnancy)


HEENT:  No


Cancer:  No


Psychosocial:  No


Integumentary:  No


Blood Disorders:  No


 (CRISTINA PEARSON MEDICAL STUDENT)


Sexually Transmitted Disease:  Yes (Chlamydia)


 (SARA CADET MD)





Family Medical History





Diabetes mellitus


  19 MOTHER


  G8 SISTER


Hypertension (mother)


Paraplegia


  19 FATHER


No Pertinent Family Hx


 (CRISTINA PEARSON MEDICAL STUDENT)





Physical Exam


Vital Signs





Vital Signs - First Documented








 20





 16:30


 


Temp 36.6


 


Pulse 92


 


Resp 13


 


B/P (MAP) 124/77 (93)


 


Pulse Ox 99


 


O2 Delivery Room Air





 (SARA CADET MD)


Vital Signs


Capillary Refill : Less Than 3 Seconds 


 (CRISTINA PEARSON MEDICAL STUDENT)


Height/Weight/BMI


Height: 4'11.00"


Weight: 176lbs. 0.8oz. [id number]; 28.00 BMI


Method:Stated


General Appearance:  moderate distress


HEENT:  normal ENT inspection


Neck:  normal inspection


Respiratory:  lungs clear, normal breath sounds


Cardiovascular:  regular rate, rhythm, no edema, no JVD, no murmur


Gastrointestinal:  normal bowel sounds, non tender, soft


Rectal:  deferred


Extremities:  normal range of motion


Back:  no CVA tenderness, no vertebral tenderness


Neurologic/Psychiatric:  no motor/sensory deficits, alert, oriented x 3


Skin:  warm/dry


Lymphatic:  no adenopathy (CRISTINA PEARSON MEDICAL STUDENT)





Progress/Results/Core Measures


Results/Orders


Lab Results





Laboratory Tests








Test


 20


17:52 20


18:46 Range/Units


 


 


Urine Color YELLOW    


 


Urine Clarity CLEAR    


 


Urine pH 7.0   5-9  


 


Urine Specific Gravity 1.015 L  1.016-1.022  


 


Urine Protein NEGATIVE   NEGATIVE  


 


Urine Glucose (UA) NEGATIVE   NEGATIVE  


 


Urine Ketones NEGATIVE   NEGATIVE  


 


Urine Nitrite NEGATIVE   NEGATIVE  


 


Urine Bilirubin NEGATIVE   NEGATIVE  


 


Urine Urobilinogen 0.2   < = 1.0  MG/DL


 


Urine Leukocyte Esterase 1+ H  NEGATIVE  


 


Urine RBC (Auto) NEGATIVE   NEGATIVE  


 


Urine RBC NONE    /HPF


 


Urine WBC RARE    /HPF


 


Urine Squamous Epithelial


Cells 10-25 H


 


  /HPF





 


Urine Crystals NONE    /LPF


 


Urine Bacteria TRACE    /HPF


 


Urine Casts NONE    /LPF


 


Urine Mucus NEGATIVE    /LPF


 


Urine Culture Indicated NO    


 


White Blood Count


 


 7.6 


 4.3-11.0


10^3/uL


 


Red Blood Count


 


 3.45 L


 4.35-5.85


10^6/uL


 


Hemoglobin  10.6 L 11.5-16.0  G/DL


 


Hematocrit  32 L 35-52  %


 


Mean Corpuscular Volume  91  80-99  FL


 


Mean Corpuscular Hemoglobin  31  25-34  PG


 


Mean Corpuscular Hemoglobin


Concent 


 34 


 32-36  G/DL





 


Red Cell Distribution Width  13.8  10.0-14.5  %


 


Platelet Count


 


 382 


 130-400


10^3/uL


 


Mean Platelet Volume  7.9  7.4-10.4  FL


 


Neutrophils (%) (Auto)  61  42-75  %


 


Lymphocytes (%) (Auto)  30  12-44  %


 


Monocytes (%) (Auto)  7  0-12  %


 


Eosinophils (%) (Auto)  2  0-10  %


 


Basophils (%) (Auto)  0  0-10  %


 


Neutrophils # (Auto)  4.6  1.8-7.8  X 10^3


 


Lymphocytes # (Auto)  2.3  1.0-4.0  X 10^3


 


Monocytes # (Auto)  0.5  0.0-1.0  X 10^3


 


Eosinophils # (Auto)


 


 0.2 


 0.0-0.3


10^3/uL


 


Basophils # (Auto)


 


 0.0 


 0.0-0.1


10^3/uL


 


Sodium Level  136  135-145  MMOL/L


 


Potassium Level  4.2  3.6-5.0  MMOL/L


 


Chloride Level  104    MMOL/L


 


Carbon Dioxide Level  24  21-32  MMOL/L


 


Anion Gap  8  5-14  MMOL/L


 


Blood Urea Nitrogen  8  7-18  MG/DL


 


Creatinine


 


 0.70 


 0.60-1.30


MG/DL


 


Estimat Glomerular Filtration


Rate 


 > 60 


  





 


BUN/Creatinine Ratio  11   


 


Glucose Level  86    MG/DL


 


Calcium Level  9.2  8.5-10.1  MG/DL


 


Corrected Calcium  9.3  8.5-10.1  MG/DL


 


Total Bilirubin  0.2  0.1-1.0  MG/DL


 


Aspartate Amino Transf


(AST/SGOT) 


 17 


 5-34  U/L





 


Alanine Aminotransferase


(ALT/SGPT) 


 21 


 0-55  U/L





 


Alkaline Phosphatase  50    U/L


 


C-Reactive Protein High


Sensitivity 


 0.34 


 0.00-0.50


MG/DL


 


Total Protein  7.1  6.4-8.2  GM/DL


 


Albumin  3.9  3.2-4.5  GM/DL


 


Human Chorionic Gonadotropin,


Quant 


 25603 H


 <5  MIU/ML








 (SARA CADET MD)


My Orders





Orders - SARA CADET MD


Ua Culture If Indicated (20 18:09)


Cbc With Automated Diff (20 18:35)


Comprehensive Metabolic Panel (20 18:35)


Hs C Reactive Protein (20 18:35)


Hcg,Quantitative (20 18:35)


Ed Iv/Invasive Line Start (20 18:35)


Ketorolac Injection (Toradol Injection) (20 20:15)


Acetaminophen  Tablet (Tylenol  Tablet) (20 20:45)


 (SARA CADET MD)


Medications Given in ED





Current Medications








 Medications  Dose


 Ordered  Sig/Monica


 Route  Start Time


 Stop Time Status Last Admin


Dose Admin


 


 Acetaminophen  1,000 mg  ONCE  ONCE


 PO  20 20:45


 20 20:46 DC 20 20:53


1,000 MG


 


 Ketorolac


 Tromethamine  15 mg  ONCE  ONCE


 IVP  20 20:15


 20 20:16 DC 20 20:17


15 MG





 (SARA CADET MD)


Vital Signs/I&O











 20





 16:30 21:00


 


Temp 36.6 36.6


 


Pulse 92 76


 


Resp 13 16


 


B/P (MAP) 124/77 (93) 122/75 (93)


 


Pulse Ox 99 99


 


O2 Delivery Room Air Room Air





 (SARA CADET MD)








Blood Pressure Mean:                    93











Departure


Impression





   Primary Impression:  


   Pelvic pain in pregnancy


Disposition:  01 HOME, SELF-CARE


Condition:  Improved





Departure-Patient Inst.


Decision time for Depature:  20:10


 (SARA CADET MD)


Referrals:  


KARISSA LAM MD (PCP)


Primary Care Physician








[facility]/ (Family)


Primary Care Physician


Patient Instructions:  Acute Pelvic Pain





Add. Discharge Instructions:  


You may use Tylenol (acetaminophen) up to 1000 mg every 6 hours as needed for 

primary pain management.  Add Toradol (ketorolac) as prescribed for further pain

management.


Return to care in the ER if you develop worsening symptoms such as escalating 

pain despite treatment, fevers over 100, vaginal bleeding, etc.  Avoid excessive

strenuous activity or anything vaginally until cleared by Dr. Lam.


Please follow-up with Dr. Lam's office tomorrow.  If pain persists you 

may need further workup such as a complete abdominal ultrasound or MRI.  This 

can be arranged on an outpatient basis during business hours.


Unisom purchased over-the-counter may be safely use to help with sleep and can 

control nausea and the next day.














All discharge instructions reviewed with patient and/or family. Voiced 

understanding.


Scripts


Ketorolac Tromethamine (Ketorolac Tromethamine) 10 Mg Tablet


10 MG PO Q6H PRN for PAIN-MODERATE (5-7), #20 TAB


   Prov: SARA CADET MD         20


This patient was interviewed and examined by me personally along with Cristina Pearson,

MS 4.  I agree with MS 4 history, physical, assessment, and documentation with 

the following additions and changes.  This 39-year-old woman at approximately 10

weeks gestational age presents to the emergency room with lower abdominal/pelvic

pain.  She is a patient of Dr. Lam and has established prenatal care 

with him for this pregnancy.  She reports having had an ultrasound and a pelvic 

exam in the office already.  This case was also discussed with Dr. Lam 

who states the fetus seems to have a cystic hygroma and also there was positive 

screening for a trisomy defect.  Patient has been having pain even since before 

the pregnancy started.  Dr. Lam does not think the nature of her pain 

is consistent with anything involving the pregnancy.  Patient denies any vaginal

or urinary symptoms other than some typical increased frequency since becoming 

pregnant.  Labs were unremarkable.  HCG was appropriate for gestational age.  

Pain was treated with Toradol and Tylenol.  Patient has an ibuprofen allergy but

tolerates Toradol without difficulty.  No imaging workup was performed in the 

ER.  Outpatient imaging workup can be pursued with safer modality such as MRI or

complete abdominal ultrasound.  Pain management was discussed with Dr. Lam.





Exam:


Gen.: Alert, oriented, mild distress, tearful


HEENT: Normocephalic, mucous membranes moist


Heart: Regular rate and rhythm without murmur


Lungs: Clear to auscultation bilaterally with normal effort


Abdomen: Soft, tender in the lower abdomen, especially in the suprapubic region,

normal bowel sounds


Skin: Normal to inspection, no rashes


Neuropsych: Alert, oriented, no focal deficits, tearful


 (SARA CADET MD)





Copy


Copies To 1:   KARISSA LAM MD, ALEX MEDICAL STUDENT      2020 18:48


SARA CADET MD        2020 20:51

## 2020-01-17 NOTE — DISCHARGE INSTRUCTIONS
Discharge Instructions


Discharge Medications


New, Converted or Re-Newed RX:  RX on Chart





Patient Instructions


Return to The Hospital For:  


as directed





Activity & Diet


Activity as Tolerated:  No





Orders-Post D/C & Referrals





Follow Up Appt:


Call to make follow up appt. for patient in 2 weeks.





Activity: 


Rest for 24 hours, than as tolerated. 














Diet: 


As tolerated-Clear Liquids only if nauseated.





 shower or tub bathe as desired.





No driving for 24 hours, no alcoholic beverages for 24 hours, and nothing per 

vagina (no tampons, douching, or intercourse) for 2 weeks.





Patient to return to the clinic as soon as possible for: Temperature greater 

than 101F, Severe Pain, Foul discharge from incision or vagina, Excessive 

Bleeding (more than a period).











KARISSA AUGUSTIN MD       Jan 17, 2020 13:12

## 2020-01-23 ENCOUNTER — HOSPITAL ENCOUNTER (EMERGENCY)
Dept: HOSPITAL 75 - ER | Age: 40
Discharge: OTHER INSTITUTION | End: 2020-01-23
Payer: MEDICAID

## 2020-01-23 VITALS — BODY MASS INDEX: 30.27 KG/M2 | WEIGHT: 148.15 LBS | HEIGHT: 58.66 IN

## 2020-01-23 VITALS — DIASTOLIC BLOOD PRESSURE: 77 MMHG | SYSTOLIC BLOOD PRESSURE: 113 MMHG

## 2020-01-23 DIAGNOSIS — Z82.49: ICD-10-CM

## 2020-01-23 DIAGNOSIS — I10: ICD-10-CM

## 2020-01-23 DIAGNOSIS — F17.210: ICD-10-CM

## 2020-01-23 DIAGNOSIS — O03.4: Primary | ICD-10-CM

## 2020-01-23 DIAGNOSIS — Z86.32: ICD-10-CM

## 2020-01-23 DIAGNOSIS — Z88.6: ICD-10-CM

## 2020-01-23 LAB
ALBUMIN SERPL-MCNC: 4.6 GM/DL (ref 3.2–4.5)
ALP SERPL-CCNC: 47 U/L (ref 40–136)
ALT SERPL-CCNC: 18 U/L (ref 0–55)
BASOPHILS # BLD AUTO: 0 10^3/UL (ref 0–0.1)
BASOPHILS NFR BLD AUTO: 0 % (ref 0–10)
BILIRUB SERPL-MCNC: 0.3 MG/DL (ref 0.1–1)
BUN/CREAT SERPL: 10
CALCIUM SERPL-MCNC: 9.9 MG/DL (ref 8.5–10.1)
CHLORIDE SERPL-SCNC: 105 MMOL/L (ref 98–107)
CO2 SERPL-SCNC: 24 MMOL/L (ref 21–32)
CREAT SERPL-MCNC: 0.73 MG/DL (ref 0.6–1.3)
EOSINOPHIL # BLD AUTO: 0.1 10^3/UL (ref 0–0.3)
EOSINOPHIL NFR BLD AUTO: 1 % (ref 0–10)
ERYTHROCYTE [DISTWIDTH] IN BLOOD BY AUTOMATED COUNT: 13.5 % (ref 10–14.5)
GFR SERPLBLD BASED ON 1.73 SQ M-ARVRAT: > 60 ML/MIN
GLUCOSE SERPL-MCNC: 97 MG/DL (ref 70–105)
HCT VFR BLD CALC: 34 % (ref 35–52)
HGB BLD-MCNC: 11.9 G/DL (ref 11.5–16)
LYMPHOCYTES # BLD AUTO: 1.6 X 10^3 (ref 1–4)
LYMPHOCYTES NFR BLD AUTO: 25 % (ref 12–44)
MANUAL DIFFERENTIAL PERFORMED BLD QL: NO
MCH RBC QN AUTO: 32 PG (ref 25–34)
MCHC RBC AUTO-ENTMCNC: 35 G/DL (ref 32–36)
MCV RBC AUTO: 90 FL (ref 80–99)
MONOCYTES # BLD AUTO: 0.5 X 10^3 (ref 0–1)
MONOCYTES NFR BLD AUTO: 8 % (ref 0–12)
NEUTROPHILS # BLD AUTO: 4.4 X 10^3 (ref 1.8–7.8)
NEUTROPHILS NFR BLD AUTO: 66 % (ref 42–75)
PLATELET # BLD: 404 10^3/UL (ref 130–400)
PMV BLD AUTO: 7.9 FL (ref 7.4–10.4)
POTASSIUM SERPL-SCNC: 3.3 MMOL/L (ref 3.6–5)
PROT SERPL-MCNC: 8.1 GM/DL (ref 6.4–8.2)
SODIUM SERPL-SCNC: 139 MMOL/L (ref 135–145)
WBC # BLD AUTO: 6.6 10^3/UL (ref 4.3–11)

## 2020-01-23 PROCEDURE — 85025 COMPLETE CBC W/AUTO DIFF WBC: CPT

## 2020-01-23 PROCEDURE — 86901 BLOOD TYPING SEROLOGIC RH(D): CPT

## 2020-01-23 PROCEDURE — 86900 BLOOD TYPING SEROLOGIC ABO: CPT

## 2020-01-23 PROCEDURE — 36415 COLL VENOUS BLD VENIPUNCTURE: CPT

## 2020-01-23 PROCEDURE — 86850 RBC ANTIBODY SCREEN: CPT

## 2020-01-23 PROCEDURE — 76801 OB US < 14 WKS SINGLE FETUS: CPT

## 2020-01-23 PROCEDURE — 80053 COMPREHEN METABOLIC PANEL: CPT

## 2020-01-23 NOTE — DIAGNOSTIC IMAGING REPORT
PROCEDURE: US OB SINGLE FETUS <14 WKS.



TECHNIQUE: Multiple real-time grayscale images were obtained over

the gravid uterus in various projections. 



INDICATION: Active miscarriage



There are no prior studies available for comparison.



There is a gestational sac within the uterus. Within the uterus

there is a fetus whose crown-rump length suggest the estimated

gestational age is 11 weeks +/- 1 week. However there are no

fetal heart tones identified.



The amniotic fluid volume is within normal limits. The placenta

is posterior. There is no clear evidence for an abruption.



Neither ovary was visualized. There is no pelvic mass or free

fluid collection noted.



IMPRESSION:

1. Fetal demise.

2. These results were discussed with Dr. Angel Garcia at the time

of this dictation. 



CRITICAL FINDING: 



Dictated by: 



  Dictated on workstation # [id number]

## 2020-01-23 NOTE — ED GU-FEMALE
General


Chief Complaint:  OB/GYN


Stated Complaint:  MISCARRIAGE


Nursing Triage Note:  


pt presents to ed with complaints of vaginal bleeding starting aprox 20 min pta.




Pt reports she is aprox 11 weeks preg and had an US on  and told she was 


having a miscarriage. Pt denies abdominal pain at this time.


Nursing Sepsis Screen:  No Definite Risk


Source:  patient


Exam Limitations:  no limitations





History of Present Illness


Date Seen by Provider:  2020


Time Seen by Provider:  07:55


Initial Comments


Here with report of acute onset of vaginal bleeding that started approximately 

20 prior to arrival. She is approximately 11 weeks pregnant. She knows that she 

is having miscarriage after ultrasound Dr. Lam showed no heartbeat and 

recently what appeared to be some tissue destruction per the patient. Not 

significantly and pain but having a fair amount of bleeding and has blood 

through her clothes on arrival.


Timing/Duration:  this morning, getting worse


Severity/Quality:  mild, burning


Location:  vaginal


Radiation:  none


Activities at Onset:  none


Prior Genitourinary Problems:  none


Modifying Factors:  Improves With Resting


Associated Symptoms:  No fever/chills, No nausea/vomiting, No urinary frequency





Allergies and Home Medications


Allergies


Coded Allergies:  


     ibuprofen (Unverified  Allergy, Mild, 09)





Home Medications


Docusate Sodium 100 Mg Capsule, 100 MG PO BID


   Prescribed by: KARISSA FERREIRA on 3/2/19 0858


Ferrous Sulfate 325 Mg Tablet, 325 MG PO TIDWM


   Prescribed by: SEAN MONTEJO on 16 1525


Ketorolac Tromethamine 10 Mg Tablet, 10 MG PO Q6H PRN for PAIN-MODERATE (5-7)


   Prescribed by: SARA CAMARILLO on 20


Labetalol HCl 200 Mg Tablet, 100 MG PO BID


   Prescribed by: KARISSA FERREIRA on 3/2/19 0858


Oxycodone HCl/Acetaminophen 1 Each Tablet, 1 TAB PO Q4HR PRN for PAIN-MODERATE 

TO SEVERE


   Prescribed by: KARISSA FERREIRA on 3/2/19 0858


Oxycodone HCl/Acetaminophen 1 Each Tablet, 1 TAB PO Q4H


   Prescribed by: KARISSA FERREIRA on 20 1311


Fir405/Iron Fumarate/FA/Dss 1 Each Tablet, 1 EACH PO DAILY, (Reported)





Patient Home Medication List


Home Medication List Reviewed:  Yes





Review of Systems


Review of Systems


Constitutional:  see HPI; No chills, No fever


EENTM:  no symptoms reported


Respiratory:  no symptoms reported


Cardiovascular:  no symptoms reported


Gastrointestinal:  see HPI


Genitourinary:  see HPI; denies dysuria, denies frequency; pain


Pregnant:  Yes


Musculoskeletal:  no symptoms reported


Skin:  no symptoms reported


Psychiatric/Neurological:  No Symptoms Reported





All Other Systemes Reviewed


Negative Unless Noted:  Yes





Past Medical-Social-Family Hx


Past Med/Social Hx:  Reviewed Nursing Past Med/Soc Hx


Patient Social History


Alcohol Use:  Denies Use


Recreational Drug Use:  No


Smoking Status:  Current Everyday Smoker


Type Used:  Cigarettes


2nd Hand Smoke Exposure:  Yes


Recent Foreign Travel:  No


Contact w/Someone Who Travel:  No


Recent Infectious Disease Expo:  No


Recent Hopitalizations:  No


Physical Abuse:  No


Sexual Abuse:  No


Mistreated:  No


Fear:  No





Immunizations Up To Date


Tetanus Booster (TDap):  Unknown


Date of Influenza Vaccine:  Oct 4, 2018





Seasonal Allergies


Seasonal Allergies:  No





Past Medical History


Surgeries:  Yes ( X 4)


 Section, Gallbladder


Respiratory:  No


Cardiac:  Yes (with last pregnancy)


Hypertension


Neurological:  No


Hx :  7


Hx Para:  6


Sexually Transmitted Disease:  Yes (Chlamydia)


HIV/AIDS:  No


Genitourinary:  No


Gastrointestinal:  No


Musculoskeletal:  Yes


Chronic Back Pain


Endocrine:  Yes (GDM with last pregnancy)


HEENT:  No


Cancer:  No


Psychosocial:  No


Integumentary:  No


Blood Disorders:  No





Family Medical History


Reviewed Nursing Family Hx





Diabetes mellitus


  19 MOTHER


  G8 SISTER


Hypertension (mother)


Paraplegia


  19 FATHER


No Pertinent Family Hx





Physical Exam


Vital Signs





Vital Signs - First Documented








 20





 07:50


 


Pulse 120


 


Resp 18


 


B/P (MAP) 136/94 (108)


 


Pulse Ox 99





Capillary Refill : Less Than 3 Seconds


Height, Weight, BMI


Height: 4'11.00"


Weight: 176lbs. 0.8oz. 79.335198as; 30.00 BMI


Method:Stated


General Appearance:  WD/WN, no apparent distress


HEENT:  PERRL/EOMI, pharynx normal


Neck:  full range of motion, supple


Cardiovascular:  no murmur, tachycardia


Respiratory:  lungs clear, no accessory muscle use


Gastrointestinal:  non tender, soft


Pelvic:  normal external exam, vaginal bleeding, other (cervix is closed. There 

was a clot within the vaginal vault but was removed. No tissue within a clot 

noted. No tissue at the cervical os.)


Back:  normal inspection, no CVA tenderness, no vertebral tenderness


Extremities:  non-tender, normal inspection


Neurologic/Psychiatric:  alert, oriented x 3


Skin:  normal color, warm/dry





Progress/Results/Core Measures


Suspected Sepsis


Recent Fever Within 48 Hours:  No


Infection Criteria Present:  None


New/Unexplained  Altered Menta:  No


Sepsis Screen:  No Definite Risk


SIRS


Temperature: 


Pulse: 120 


Respiratory Rate: 18


 


Laboratory Tests


20 08:00: White Blood Count 6.6


Blood Pressure 136 /94 


Mean: 108


 


Laboratory Tests


20 08:00: 


Creatinine 0.73, Platelet Count 404H, Total Bilirubin 0.3








Results/Orders


Lab Results





Laboratory Tests








Test


 20


08:00 Range/Units


 


 


White Blood Count


 6.6 


 4.3-11.0


10^3/uL


 


Red Blood Count


 3.76 L


 4.35-5.85


10^6/uL


 


Hemoglobin 11.9  11.5-16.0  G/DL


 


Hematocrit 34 L 35-52  %


 


Mean Corpuscular Volume 90  80-99  FL


 


Mean Corpuscular Hemoglobin 32  25-34  PG


 


Mean Corpuscular Hemoglobin


Concent 35 


 32-36  G/DL





 


Red Cell Distribution Width 13.5  10.0-14.5  %


 


Platelet Count


 404 H


 130-400


10^3/uL


 


Mean Platelet Volume 7.9  7.4-10.4  FL


 


Neutrophils (%) (Auto) 66  42-75  %


 


Lymphocytes (%) (Auto) 25  12-44  %


 


Monocytes (%) (Auto) 8  0-12  %


 


Eosinophils (%) (Auto) 1  0-10  %


 


Basophils (%) (Auto) 0  0-10  %


 


Neutrophils # (Auto) 4.4  1.8-7.8  X 10^3


 


Lymphocytes # (Auto) 1.6  1.0-4.0  X 10^3


 


Monocytes # (Auto) 0.5  0.0-1.0  X 10^3


 


Eosinophils # (Auto)


 0.1 


 0.0-0.3


10^3/uL


 


Basophils # (Auto)


 0.0 


 0.0-0.1


10^3/uL


 


Sodium Level 139  135-145  MMOL/L


 


Potassium Level 3.3 L 3.6-5.0  MMOL/L


 


Chloride Level 105    MMOL/L


 


Carbon Dioxide Level 24  21-32  MMOL/L


 


Anion Gap 10  5-14  MMOL/L


 


Blood Urea Nitrogen 7  7-18  MG/DL


 


Creatinine


 0.73 


 0.60-1.30


MG/DL


 


Estimat Glomerular Filtration


Rate > 60 


  





 


BUN/Creatinine Ratio 10   


 


Glucose Level 97    MG/DL


 


Calcium Level 9.9  8.5-10.1  MG/DL


 


Corrected Calcium   8.5-10.1  MG/DL


 


Total Bilirubin 0.3  0.1-1.0  MG/DL


 


Aspartate Amino Transf


(AST/SGOT) 18 


 5-34  U/L





 


Alanine Aminotransferase


(ALT/SGPT) 18 


 0-55  U/L





 


Alkaline Phosphatase 47    U/L


 


Total Protein 8.1  6.4-8.2  GM/DL


 


Albumin 4.6 H 3.2-4.5  GM/DL








My Orders





Orders - PINA ARIAS MD


Cbc With Automated Diff (20 08:03)


Ed Iv/Invasive Line Start (20 08:03)


Ed Iv/Invasive Line Start (20 08:03)


Comprehensive Metabolic Panel (20 08:03)


Type And Screen (20 08:03)


Lactated Ringers (Lr 1000 Ml Iv Solution (20 09:03)


Us Ob Single Fetus<14 [id number] (20 08:03)





Medications Given in ED





Current Medications








 Medications  Dose


 Ordered  Sig/Monica


 Route  Start Time


 Stop Time Status Last Admin


Dose Admin


 


 Lactated Ringer's  1,000 ml @ 


 0 mls/hr  Q0M ONCE


 IV  20 09:03


 20 09:04 DC 20 09:41


0 MLS/HR








Vital Signs/I&O











 20





 07:50


 


Pulse 120


 


Resp 18


 


B/P (MAP) 136/94 (108)


 


Pulse Ox 99





Capillary Refill : Less Than 3 Seconds








Blood Pressure Mean:                    108








Progress Note :  


Progress Note


Seen and evaluated. IV 2, labs, LR 1 L bolus, type and screen and ultrasound 

ordered. Patient is O+. Monitor patient. 0940: Ultrasound complete and shows 

intrauterine pregnancy of approximately 11 weeks without noted fetal heart 

tones. Pelvic exam complete and noted above. Patient is deciding if she would 

like to pursue D&C or continue at home. Monitor patient. 1023: I have left 

messages with Dr. Lam. Patient does not want to pursue D&C today and 

she would like to think about it and watch for another day and see if 

miscarriage will progress naturally. A reasonable plan at this point. Discharged

home with return precautions. Patient verbalized understanding of instructions 

and agreement with plan.





Diagnostic Imaging





   Diagonstic Imaging:  Ultrasound


   Plain Films/CT/US/NM/MRI:  other


Comments


                 [facility] [place], [place]





NAME:   MEKHI BLANKENSHIP


[facility] REC#:   [id number]


ACCOUNT#:   [id number]


PT STATUS:   REG ER


:   1980


PHYSICIAN:   PINA ARIAS MD


ADMIT DATE:   20/ER


                                   ***Draft***


Date of Exam:20





US OB SINGLE FETUS<14 [id number]








PROCEDURE: US OB SINGLE FETUS <14 WKS.





TECHNIQUE: Multiple real-time grayscale images were obtained over


the gravid uterus in various projections. 





INDICATION: Active miscarriage





There are no prior studies available for comparison.





There is a gestational sac within the uterus. Within the uterus


there is a fetus whose crown-rump length suggest the estimated


gestational age is 11 weeks +/- 1 week. However there are no


fetal heart tones identified.





The amniotic fluid volume is within normal limits. The placenta


is posterior. There is no clear evidence for an abruption.





Neither ovary was visualized. There is no pelvic mass or free


fluid collection noted.





IMPRESSION:


1. Fetal demise.


2. These results were discussed with Dr. Angel Arias at the time


of this dictation. 





CRITICAL FINDING: 





  Dictated on workstation # [id number]








Dict:   20 0948


Trans:   20 1031


MANISH [id number]





Interpreted by:     JOSE SANCHEZ MD


Electronically signed by:





Departure


Impression





   Primary Impression:  


   Incomplete miscarriage


Disposition:   ADMITTED AS INPATIENT


Condition:  Stable





Departure-Patient Inst.


Referrals:  


KARISSA LAM MD (PCP)


Primary Care Physician








[facility] (Family)


Primary Care Physician


Patient Instructions:  Miscarriage (DC)





Add. Discharge Instructions:  








All discharge instructions reviewed with patient and/or family. Voiced 

understanding.





Continue home medications as directed. Follow-up with Dr. Lam next week

as scheduled. You may take Tylenol as needed for pain. If that is not working 

you may take the prescribed pain medicine but don't take both at the same time 

as they both have acetaminophen in them. Return for worse pain, bleeding greater

than 2 pads per hour for more than 2 hours, weakness, fevers, breathing problems

or other concerns as needed.


Scripts


Hydrocodone Bit/Acetaminophen (LORTAB  7.5 MG TABLET) 1 Ea Tablet


1 EACH PO Q6H, #6 TAB 0 Refills


   Prov: PINA ARIAS MD         20





Copy


Copies To 1:   KARISSA LAM MD, TIMOTHY D MD          2020 08:59

## 2020-10-09 ENCOUNTER — HOSPITAL ENCOUNTER (EMERGENCY)
Dept: HOSPITAL 75 - ER | Age: 40
Discharge: HOME | End: 2020-10-09
Payer: MEDICAID

## 2020-10-09 VITALS — SYSTOLIC BLOOD PRESSURE: 99 MMHG | DIASTOLIC BLOOD PRESSURE: 63 MMHG

## 2020-10-09 VITALS — WEIGHT: 139.99 LBS | BODY MASS INDEX: 28.22 KG/M2 | HEIGHT: 58.98 IN

## 2020-10-09 DIAGNOSIS — Z20.828: ICD-10-CM

## 2020-10-09 DIAGNOSIS — J18.9: Primary | ICD-10-CM

## 2020-10-09 DIAGNOSIS — M54.9: ICD-10-CM

## 2020-10-09 DIAGNOSIS — Z77.22: ICD-10-CM

## 2020-10-09 DIAGNOSIS — I10: ICD-10-CM

## 2020-10-09 DIAGNOSIS — Z79.891: ICD-10-CM

## 2020-10-09 DIAGNOSIS — Z33.1: ICD-10-CM

## 2020-10-09 DIAGNOSIS — Z88.6: ICD-10-CM

## 2020-10-09 DIAGNOSIS — G89.29: ICD-10-CM

## 2020-10-09 LAB
ALBUMIN SERPL-MCNC: 4.2 GM/DL (ref 3.2–4.5)
ALP SERPL-CCNC: 54 U/L (ref 40–136)
ALT SERPL-CCNC: 37 U/L (ref 0–55)
APTT BLD: 31 SEC (ref 24–35)
BASOPHILS # BLD AUTO: 0 10^3/UL (ref 0–0.1)
BASOPHILS NFR BLD AUTO: 0 % (ref 0–10)
BASOPHILS NFR BLD MANUAL: 1 %
BILIRUB SERPL-MCNC: 0.5 MG/DL (ref 0.1–1)
BUN/CREAT SERPL: 12
CALCIUM SERPL-MCNC: 9.6 MG/DL (ref 8.5–10.1)
CHLORIDE SERPL-SCNC: 103 MMOL/L (ref 98–107)
CO2 SERPL-SCNC: 25 MMOL/L (ref 21–32)
CREAT SERPL-MCNC: 0.82 MG/DL (ref 0.6–1.3)
EOSINOPHIL # BLD AUTO: 0 10^3/UL (ref 0–0.3)
EOSINOPHIL NFR BLD AUTO: 0 % (ref 0–10)
EOSINOPHIL NFR BLD MANUAL: 0 %
FIBRINOGEN PPP-MCNC: 396 MG/DL (ref 221–496)
GFR SERPLBLD BASED ON 1.73 SQ M-ARVRAT: > 60 ML/MIN
GLUCOSE SERPL-MCNC: 94 MG/DL (ref 70–105)
HCT VFR BLD CALC: 36 % (ref 35–52)
HGB BLD-MCNC: 12.1 G/DL (ref 11.5–16)
INR PPP: 1 (ref 0.8–1.4)
LYMPHOCYTES # BLD AUTO: 0.6 10^3/UL (ref 1–4)
LYMPHOCYTES NFR BLD AUTO: 5 % (ref 12–44)
MANUAL DIFFERENTIAL PERFORMED BLD QL: YES
MCH RBC QN AUTO: 30 PG (ref 25–34)
MCHC RBC AUTO-ENTMCNC: 34 G/DL (ref 32–36)
MCV RBC AUTO: 90 FL (ref 80–99)
MONOCYTES # BLD AUTO: 0.2 10^3/UL (ref 0–1)
MONOCYTES NFR BLD AUTO: 2 % (ref 0–12)
MONOCYTES NFR BLD: 1 %
NEUTROPHILS # BLD AUTO: 11.5 10^3/UL (ref 1.8–7.8)
NEUTROPHILS NFR BLD AUTO: 93 % (ref 42–75)
NEUTS BAND NFR BLD MANUAL: 87 %
NEUTS BAND NFR BLD: 5 %
PLATELET # BLD: 323 10^3/UL (ref 130–400)
PMV BLD AUTO: 7.8 FL (ref 9–12.2)
POTASSIUM SERPL-SCNC: 3.8 MMOL/L (ref 3.6–5)
PROT SERPL-MCNC: 7.5 GM/DL (ref 6.4–8.2)
PROTHROMBIN TIME: 13.4 SEC (ref 12.2–14.7)
RBC MORPH BLD: NORMAL
SODIUM SERPL-SCNC: 137 MMOL/L (ref 135–145)
VARIANT LYMPHS NFR BLD MANUAL: 6 %
WBC # BLD AUTO: 12.3 10^3/UL (ref 4.3–11)

## 2020-10-09 PROCEDURE — 85384 FIBRINOGEN ACTIVITY: CPT

## 2020-10-09 PROCEDURE — 85027 COMPLETE CBC AUTOMATED: CPT

## 2020-10-09 PROCEDURE — 87635 SARS-COV-2 COVID-19 AMP PRB: CPT

## 2020-10-09 PROCEDURE — 93041 RHYTHM ECG TRACING: CPT

## 2020-10-09 PROCEDURE — 83615 LACTATE (LD) (LDH) ENZYME: CPT

## 2020-10-09 PROCEDURE — 82728 ASSAY OF FERRITIN: CPT

## 2020-10-09 PROCEDURE — 85610 PROTHROMBIN TIME: CPT

## 2020-10-09 PROCEDURE — 85007 BL SMEAR W/DIFF WBC COUNT: CPT

## 2020-10-09 PROCEDURE — 85730 THROMBOPLASTIN TIME PARTIAL: CPT

## 2020-10-09 PROCEDURE — 80053 COMPREHEN METABOLIC PANEL: CPT

## 2020-10-09 PROCEDURE — 36415 COLL VENOUS BLD VENIPUNCTURE: CPT

## 2020-10-09 PROCEDURE — 86141 C-REACTIVE PROTEIN HS: CPT

## 2020-10-09 PROCEDURE — 84703 CHORIONIC GONADOTROPIN ASSAY: CPT

## 2020-10-09 PROCEDURE — 99284 EMERGENCY DEPT VISIT MOD MDM: CPT

## 2020-10-09 PROCEDURE — 71045 X-RAY EXAM CHEST 1 VIEW: CPT

## 2020-10-09 PROCEDURE — 83605 ASSAY OF LACTIC ACID: CPT

## 2020-10-09 PROCEDURE — 87804 INFLUENZA ASSAY W/OPTIC: CPT

## 2020-10-09 PROCEDURE — 84484 ASSAY OF TROPONIN QUANT: CPT

## 2020-10-09 NOTE — DIAGNOSTIC IMAGING REPORT
Clinical indication: Patient with chest pain.



Exam: Portable chest x-ray upright view.



Comparisons: Chest x-ray dated 09/27/2013.



Findings:



Lungs/pleura: There is development of mild right basilar

atelectasis versus infiltrate. Left lung is clear. There is no

pneumothorax. There is no pleural effusion.



Mediastinum: Unremarkable. 



Pulmonary vasculature: Unremarkable.



Heart: Unremarkable.



Bones/extrathoracic soft tissue: Unremarkable.



Impression:

There is interval development of mild right lung base atelectasis

versus infiltrate.



Dictated by: 



  Dictated on workstation # [id number]

## 2020-10-09 NOTE — ED CHEST PAIN
General


Stated Complaint:  CHEST PAIN





History of Present Illness


Date Seen by Provider:  Oct 9, 2020


Time Seen by Provider:  16:46


Initial Comments


40-year-old female presents with chest pain, body aches, shortness of breath, 

chills. She reports that started this morning she had a shower. Patient caused 

the chest pain is basically related to her overall generalized body aches and 

with deep breath. She feels short of breath. She has no known sick contacts.





Allergies and Home Medications


Allergies


Coded Allergies:  


     ibuprofen (Unverified  Allergy, Mild, 09)





Home Medications


Azithromycin 250 Mg Tablet, 250 MG PO UD


   TAKE 2 TABLETS ON DAY ONE THEN TAKE 1 TABLET DAILY FOR FOUR MORE DAYS 


   Prescribed by: UMESH CARLOS on 10/9/20 1818


Docusate Sodium 100 Mg Capsule, 100 MG PO BID


   Prescribed by: KARISSA FERREIRA on 3/2/19 0858


Ferrous Sulfate 325 Mg Tablet, 325 MG PO TIDWM


   Prescribed by: SEAN MONTEJO on 16 1525


Ketorolac Tromethamine 10 Mg Tablet, 10 MG PO Q6H PRN for PAIN-MODERATE (5-7)


   Prescribed by: SARA CAMARILLO on 20 205


Oxycodone HCl/Acetaminophen 1 Each Tablet, 1 TAB PO Q4H


   Prescribed by: KARISSA FERREIRA on 20 1529


[id number]/Iron Fumarate/FA/Dss 1 Each Tablet, 1 EACH PO DAILY, (Reported)





Patient Home Medication List


Home Medication List Reviewed:  Yes





Review of Systems


Review of Systems


Constitutional:  chills; No fever; malaise


Respiratory:  Cough, Shortness of Air


Cardiovascular:  Chest Pain; Denies Irregular Heart Rate


Gastrointestinal:  Denies Abdominal Pain, Denies Diarrhea, Denies Nausea, Denies

Vomiting


Musculoskeletal:  no symptoms reported


Skin:  no symptoms reported


Psychiatric/Neurological:  No Symptoms Reported





Past Medical-Social-Family Hx


Past Med/Social Hx:  Reviewed Nursing Past Med/Soc Hx


Patient Social History


Type Used:  Cigarettes


2nd Hand Smoke Exposure:  Yes


Recent Foreign Travel:  No


Contact w/Someone Who Travel:  No


Recent Hopitalizations:  No





Immunizations Up To Date


Tetanus Booster (TDap):  Unknown


Date of Influenza Vaccine:  Oct 4, 2018





Seasonal Allergies


Seasonal Allergies:  No





Past Medical History


Surgeries:  Yes ( X 4)


 Section, Gallbladder


Respiratory:  No


Cardiac:  Yes (with last pregnancy)


Hypertension


Neurological:  No


Sexually Transmitted Disease:  Yes (Chlamydia)


HIV/AIDS:  No


Genitourinary:  No


Gastrointestinal:  No


Musculoskeletal:  Yes


Chronic Back Pain


Endocrine:  Yes (GDM with last pregnancy)


HEENT:  No


Cancer:  No


Psychosocial:  No


Integumentary:  No


Blood Disorders:  No





Family Medical History





Diabetes mellitus


  19 MOTHER


  G8 SISTER


Hypertension (mother)


Paraplegia


  19 FATHER


No Pertinent Family Hx





Physical Exam


Vital Signs





Vital Signs - First Documented








 10/9/20





 16:40


 


Temp 36.8


 


Pulse 87


 


Resp 15


 


B/P (MAP) 98/61 (73)


 


Pulse Ox 100


 


O2 Delivery Room Air





Capillary Refill :


Height, Weight, BMI


Height: 4'11.00"


Weight: 176lbs. 0.8oz. 79.191416zk; 29.00 BMI


Method:Stated


General Appearance:  No Apparent Distress, Anxious


Neck:  Normal Inspection, Non Tender


Respiratory:  Lungs Clear, Normal Breath Sounds


Cardiovascular:  Regular Rate, Rhythm


Gastrointestinal:  Non Tender, Soft


Extremity:  Normal Capillary Refill, Normal Inspection


Neurologic/Psychiatric:  Alert, Oriented x3, Normal Mood/Affect, CNs II-XII Norm

as Tested


Skin:  Normal Color, Warm/Dry





Focused Exam


Lactate Level


10/9/20 17:22: Lactic Acid Level 1.44





Lactic Acid Level





Laboratory Tests








Test


 10/9/20


17:22


 


Lactic Acid Level


 1.44 MMOL/L


(0.50-2.00)











Progress/Results/Core Measures


Results/Orders


Lab Results





Laboratory Tests








Test


 10/9/20


17:10 10/9/20


17:22 Range/Units


 


 


White Blood Count


 


 12.3 H


 4.3-11.0


10^3/uL


 


Red Blood Count


 


 3.98 


 3.80-5.11


10^6/uL


 


Hemoglobin  12.1  11.5-16.0  g/dL


 


Hematocrit  36  35-52  %


 


Mean Corpuscular Volume  90  80-99  fL


 


Mean Corpuscular Hemoglobin  30  25-34  pg


 


Mean Corpuscular Hemoglobin


Concent 


 34 


 32-36  g/dL





 


Red Cell Distribution Width  13.6  10.0-14.5  %


 


Platelet Count


 


 323 


 130-400


10^3/uL


 


Mean Platelet Volume  7.8 L 9.0-12.2  fL


 


Immature Granulocyte % (Auto)  1   %


 


Neutrophils (%) (Auto)  93 H 42-75  %


 


Lymphocytes (%) (Auto)  5 L 12-44  %


 


Monocytes (%) (Auto)  2  0-12  %


 


Eosinophils (%) (Auto)  0  0-10  %


 


Basophils (%) (Auto)  0  0-10  %


 


Neutrophils # (Auto)


 


 11.5 H


 1.8-7.8


10^3/uL


 


Lymphocytes # (Auto)


 


 0.6 L


 1.0-4.0


10^3/uL


 


Monocytes # (Auto)


 


 0.2 


 0.0-1.0


10^3/uL


 


Eosinophils # (Auto)


 


 0.0 


 0.0-0.3


10^3/uL


 


Basophils # (Auto)


 


 0.0 


 0.0-0.1


10^3/uL


 


Immature Granulocyte # (Auto)


 


 0.1 


 0.0-0.1


10^3/uL


 


Neutrophils % (Manual)  87   %


 


Lymphocytes % (Manual)  6   %


 


Monocytes % (Manual)  1   %


 


Eosinophils % (Manual)  0   %


 


Basophils % (Manual)  1   %


 


Band Neutrophils  5   %


 


Blood Morphology Comment  NORMAL   


 


Prothrombin Time  13.4  12.2-14.7  SEC


 


INR Comment  1.0  0.8-1.4  


 


Activated Partial


Thromboplast Time 


 31 


 24-35  SEC





 


Fibrinogen  396  221-496  MG/DL


 


Sodium Level  137  135-145  MMOL/L


 


Potassium Level  3.8  3.6-5.0  MMOL/L


 


Chloride Level  103    MMOL/L


 


Carbon Dioxide Level  25  21-32  MMOL/L


 


Anion Gap  9  5-14  MMOL/L


 


Blood Urea Nitrogen  10  7-18  MG/DL


 


Creatinine


 


 0.82 


 0.60-1.30


MG/DL


 


Estimat Glomerular Filtration


Rate 


 > 60 


  





 


BUN/Creatinine Ratio  12   


 


Glucose Level  94    MG/DL


 


Lactic Acid Level


 


 1.44 


 0.50-2.00


MMOL/L


 


Calcium Level  9.6  8.5-10.1  MG/DL


 


Corrected Calcium  9.4  8.5-10.1  MG/DL


 


Total Bilirubin  0.5  0.1-1.0  MG/DL


 


Aspartate Amino Transf


(AST/SGOT) 


 35 H


 5-34  U/L





 


Alanine Aminotransferase


(ALT/SGPT) 


 37 


 0-55  U/L





 


Alkaline Phosphatase  54    U/L


 


Lactate Dehydrogenase  156  125-220  U/L


 


Troponin I  < 0.028  <0.028  NG/ML


 


C-Reactive Protein High


Sensitivity 


 0.11 


 0.00-0.50


MG/DL


 


Total Protein  7.5  6.4-8.2  GM/DL


 


Albumin  4.2  3.2-4.5  GM/DL








Micro Results





Microbiology


10/9/20 Influenza Types A,B Antigen (KARINA) - Final, Complete


          





My Orders





Orders - UMESH CARLOS DO


Vital Signs: Every 4 Hours (Or (10/9/20 16:55)


Monitor-Rhythm Ecg Trace Only (10/9/20 16:55)


Urine Pregnancy Bedside (10/9/20 16:55)


Cbc With Automated Diff (10/9/20 16:55)


Comprehensive Metabolic Panel (10/9/20 16:55)


Ferritin (10/9/20 16:55)


LDH (10/9/20 16:55)


Hs C Reactive Protein (10/9/20 16:55)


Troponin I (10/9/20 16:55)


Lactic Acid Analyzer (10/9/20 16:55)


Protime With Inr (10/9/20 16:55)


Partial Thromboplastin Time (10/9/20 16:55)


Fibrinogen (10/9/20 16:55)


Ekg Tracing (10/9/20 16:55)


Chest 1 View, Ap/Pa Only (10/9/20 16:55)


Acetaminophen Tablet/Caplet (Tylenol  T (10/9/20 17:00)


Influenza A And B Antigens (10/9/20 16:55)


Coronavirus Sars-Cov-2 So  (10/9/20 16:55)


Manual Differential (10/9/20 17:22)





Medications Given in ED





Vital Signs/I&O











 10/9/20 10/9/20





 16:40 18:22


 


Temp 36.8 36.8


 


Pulse 87 89


 


Resp 15 18


 


B/P (MAP) 98/61 (73) 99/63 (73)


 


Pulse Ox 100 100


 


O2 Delivery Room Air Room Air











Progress


Progress Note :  


Progress Note


Patient with incidental positive urine pregnancy test. Patient was found have a 

questionable pneumonia. She denies any cough or shortness of breath but does 

have some generalized body aches and chills. Patient also had elevated white 

count. Based on her possibility of being pregnant along with elevated white 

count I will treat her with azithromycin. I recommend she start prenatal 

vitamins. She will need to follow-up with her primary care provider for recheck 

of her symptoms and further prenatal care. Patient stable and will be discharged





Diagnostic Imaging





   Diagonstic Imaging:  Xray


   Plain Films/CT/US/NM/MRI:  chest


Comments


                 [facility].


                                [place], [place]





NAME:   MEKHI BLANKENSHIP


[facility] REC#:   [id number]


ACCOUNT#:   [id number]


PT STATUS:   REG ER


:   1980


PHYSICIAN:   UMESH CARLOS DO


ADMIT DATE:   10/09/20/ER


                                   ***Draft***


Date of Exam:10/09/20





CHEST 1 VIEW, AP/PA ONLY








Clinical indication: Patient with chest pain.





Exam: Portable chest x-ray upright view.





Comparisons: Chest x-ray dated 2013.





Findings:





Lungs/pleura: There is development of mild right basilar


atelectasis versus infiltrate. Left lung is clear. There is no


pneumothorax. There is no pleural effusion.





Mediastinum: Unremarkable. 





Pulmonary vasculature: Unremarkable.





Heart: Unremarkable.





Bones/extrathoracic soft tissue: Unremarkable.





Impression:


There is interval development of mild right lung base atelectasis


versus infiltrate.


   Reviewed:  Reviewed by Me, Reviewed/Discussed





Departure


Impression





   Primary Impression:  


   Right lower lobe pneumonia


   Qualified Codes:  J18.9 - Pneumonia, unspecified organism


   Additional Impression:  


   Pregnancy, incidental


Disposition:  01 HOME, SELF-CARE


Condition:  Stable





Departure-Patient Inst.


Referrals:  


[facility] (PCP/Family)


Primary Care Physician


Patient Instructions:  COVID19, Pneumonia in Adults, Prenatal Care





Add. Discharge Instructions:  


Please start a prenatal vitamin


Scripts


Azithromycin (Azithromycin) 250 Mg Tablet


250 MG PO UD, #6 TAB


   TAKE 2 TABLETS ON DAY ONE THEN TAKE 1 TABLET DAILY FOR FOUR MORE DAYS


   Prov: UMESH CARLOS DO         10/9/20











UMESH CARLOS DO                Oct 9, 2020 16:54

## 2020-10-15 ENCOUNTER — HOSPITAL ENCOUNTER (EMERGENCY)
Dept: HOSPITAL 75 - ER | Age: 40
LOS: 1 days | Discharge: HOME | End: 2020-10-16
Payer: MEDICAID

## 2020-10-15 ENCOUNTER — HOSPITAL ENCOUNTER (EMERGENCY)
Dept: HOSPITAL 75 - ER | Age: 40
Discharge: LEFT BEFORE DISCHARGE | End: 2020-10-15
Payer: MEDICAID

## 2020-10-15 VITALS — HEIGHT: 58.98 IN | BODY MASS INDEX: 28.22 KG/M2 | WEIGHT: 139.99 LBS

## 2020-10-15 DIAGNOSIS — G89.29: ICD-10-CM

## 2020-10-15 DIAGNOSIS — Z88.6: ICD-10-CM

## 2020-10-15 DIAGNOSIS — Z82.49: ICD-10-CM

## 2020-10-15 DIAGNOSIS — Z20.828: ICD-10-CM

## 2020-10-15 DIAGNOSIS — F15.90: ICD-10-CM

## 2020-10-15 DIAGNOSIS — Z83.3: ICD-10-CM

## 2020-10-15 DIAGNOSIS — Z32.01: ICD-10-CM

## 2020-10-15 DIAGNOSIS — S49.91XA: Primary | ICD-10-CM
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
## 2020-10-15 DIAGNOSIS — S40.011A: Primary | ICD-10-CM

## 2020-10-15 DIAGNOSIS — F17.210: ICD-10-CM

## 2020-10-15 DIAGNOSIS — Z79.891: ICD-10-CM

## 2020-10-15 DIAGNOSIS — M54.9: ICD-10-CM

## 2020-10-15 DIAGNOSIS — F41.9: ICD-10-CM

## 2020-10-15 PROCEDURE — 80306 DRUG TEST PRSMV INSTRMNT: CPT

## 2020-10-15 PROCEDURE — 73030 X-RAY EXAM OF SHOULDER: CPT

## 2020-10-15 PROCEDURE — 84703 CHORIONIC GONADOTROPIN ASSAY: CPT
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
## 2020-10-16 VITALS — SYSTOLIC BLOOD PRESSURE: 117 MMHG | DIASTOLIC BLOOD PRESSURE: 81 MMHG

## 2020-10-16 LAB
BARBITURATES UR QL: NEGATIVE
BENZODIAZ UR QL SCN: NEGATIVE
COCAINE UR QL: NEGATIVE
METHADONE UR QL SCN: NEGATIVE
METHAMPHETAMINE SCREEN URINE S: POSITIVE
OPIATES UR QL SCN: NEGATIVE
OXYCODONE UR QL: NEGATIVE
PROPOXYPH UR QL: NEGATIVE
TRICYCLICS UR QL SCN: NEGATIVE

## 2020-10-16 NOTE — DIAGNOSTIC IMAGING REPORT
EXAM: SHOULDER, RIGHT, 3 VIEWS



INDICATION: Right shoulder pain.



COMPARISON: None.



FINDINGS: No fracture or malalignment. No suspicious osteoblastic

or lytic lesions. Right lung field is clear.



IMPRESSION: Negative right shoulder radiographs.



Dictated by: 



  Dictated on workstation # [id number]

## 2021-07-09 ENCOUNTER — HOSPITAL ENCOUNTER (EMERGENCY)
Dept: HOSPITAL 75 - ER | Age: 41
Discharge: HOME | End: 2021-07-09
Payer: MEDICAID

## 2021-07-09 VITALS — WEIGHT: 134.48 LBS | HEIGHT: 58.66 IN | BODY MASS INDEX: 27.48 KG/M2

## 2021-07-09 VITALS — SYSTOLIC BLOOD PRESSURE: 115 MMHG | DIASTOLIC BLOOD PRESSURE: 82 MMHG

## 2021-07-09 DIAGNOSIS — M25.531: ICD-10-CM

## 2021-07-09 DIAGNOSIS — M25.551: ICD-10-CM

## 2021-07-09 DIAGNOSIS — Z88.6: ICD-10-CM

## 2021-07-09 DIAGNOSIS — M54.9: ICD-10-CM

## 2021-07-09 DIAGNOSIS — Z79.891: ICD-10-CM
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
## 2021-07-09 DIAGNOSIS — M25.562: ICD-10-CM

## 2021-07-09 DIAGNOSIS — S40.021A: Primary | ICD-10-CM

## 2021-07-09 DIAGNOSIS — G89.29: ICD-10-CM

## 2021-07-09 DIAGNOSIS — M25.511: ICD-10-CM
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
## 2021-07-09 PROCEDURE — 72100 X-RAY EXAM L-S SPINE 2/3 VWS: CPT

## 2021-07-09 PROCEDURE — 73030 X-RAY EXAM OF SHOULDER: CPT

## 2021-07-09 PROCEDURE — 73552 X-RAY EXAM OF FEMUR 2/>: CPT

## 2021-07-09 PROCEDURE — 73562 X-RAY EXAM OF KNEE 3: CPT

## 2021-07-09 PROCEDURE — 71045 X-RAY EXAM CHEST 1 VIEW: CPT
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
## 2021-07-28 ENCOUNTER — HOSPITAL ENCOUNTER (EMERGENCY)
Dept: HOSPITAL 75 - ER | Age: 41
Discharge: HOME | End: 2021-07-28
Payer: MEDICAID

## 2021-07-28 VITALS — DIASTOLIC BLOOD PRESSURE: 84 MMHG | SYSTOLIC BLOOD PRESSURE: 124 MMHG

## 2021-07-28 VITALS — WEIGHT: 132.94 LBS | HEIGHT: 59.02 IN | BODY MASS INDEX: 26.8 KG/M2
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
## 2021-07-28 DIAGNOSIS — S93.401A: Primary | ICD-10-CM

## 2021-07-28 DIAGNOSIS — S63.501A: ICD-10-CM

## 2021-07-28 DIAGNOSIS — I10: ICD-10-CM

## 2021-07-28 DIAGNOSIS — S90.811A: ICD-10-CM

## 2021-07-28 DIAGNOSIS — F17.210: ICD-10-CM

## 2021-07-28 PROCEDURE — 73130 X-RAY EXAM OF HAND: CPT

## 2021-07-28 PROCEDURE — 73610 X-RAY EXAM OF ANKLE: CPT

## 2021-07-28 PROCEDURE — 73630 X-RAY EXAM OF FOOT: CPT

## 2021-07-28 PROCEDURE — 73090 X-RAY EXAM OF FOREARM: CPT

## 2021-07-28 NOTE — DIAGNOSTIC IMAGING REPORT
CLINICAL INDICATION: Patient fell off her porch and injured right

arm.



EXAMS:

1: X-ray of the right forearm, 2 views.

2: X-ray of the right hand, 3 views.



COMPARISON: None.



FINDINGS:

X-ray of the right hand and right forearm show no acute fracture.

There is roughly 3 mm of negative ulnar variance of unknown age.

Otherwise, the remainder of the right hand and right forearm show

no significant bony abnormality. The scapholunate interval and

distal radioulnar joints otherwise unremarkable. There is no

elbow effusion.



IMPRESSION:

1: There is negative ulnar variance of unknown age.



2: Otherwise, there is no acute fracture or dislocation.



Dictated by: 



  Dictated on workstation # [id number]

## 2021-07-28 NOTE — ED FALL/INJURY
General


Chief Complaint:  Trauma-Non Activation


Stated Complaint:  FALL;ANKLE INJ


Nursing Triage Note:  


PT STATES SHE FELL OFF HER PORCH AND INJURED RIGHT ANKLE AND RIGHT ARM.


Source:  patient


Exam Limitations:  no limitations





History of Present Illness


Date Seen by Provider:  2021


Time Seen by Provider:  08:18


Initial Comments


This 40-year-old woman presents to the emergency room with complaints of injury 

to the right hand, wrist, foot, and ankle after tripping on stairs about 45 

minutes prior to arrival.  She reports this was purely mechanical without any 

prodrome such as dizziness or shortness of breath.  She was quick to ask for a 

note excusing her from court at 0900.





Allergies and Home Medications


Allergies


Coded Allergies:  


     ibuprofen (Unverified  Allergy, Mild, 09)





Home Medications


Azithromycin 250 Mg Tablet, 250 MG PO UD


   TAKE 2 TABLETS ON DAY ONE THEN TAKE 1 TABLET DAILY FOR FOUR MORE DAYS 


   Prescribed by: UMESH CARLOS on 10/9/20 1818


Docusate Sodium 100 Mg Capsule, 100 MG PO BID


   Prescribed by: KARISSA FERREIRA on 3/2/19 0858


Ferrous Sulfate 325 Mg Tablet, 325 MG PO TIDWM


   Prescribed by: SEAN MONTEJO on 16 1525


Ketorolac Tromethamine 10 Mg Tablet, 10 MG PO Q6H PRN for PAIN-MODERATE (5-7)


   Prescribed by: SARA CAMARILLO on 20 205


Methocarbamol 750 Mg Tablet, 750 MG PO Q6-8HR


   Prescribed by: CLARI ALVARENGA on 21 1500


Naproxen 500 Mg Tablet, 500 MG PO BID


   Prescribed by: CLARI ALVARENGA on 21 1500


Oxycodone HCl/Acetaminophen 1 Each Tablet, 1 TAB PO Q4H


   Prescribed by: KARISSA FERREIRA on 20 1529


Wnb940/Iron Fumarate/FA/Dss 1 Each Tablet, 1 EACH PO DAILY, (Reported)





Patient Home Medication List


Home Medication List Reviewed:  Yes





Review of Systems


Review of Systems


Constitutional:  no symptoms reported


Respiratory:  no symptoms reported


Cardiovascular:  no symptoms reported


Musculoskeletal:  see HPI


Skin:  other (Abrasion over anterior right proximal foot)


Psychiatric/Neurological:  No Symptoms Reported





Past Medical-Social-Family Hx


Patient Social History


Tobacco Use?:  Yes


Tobacco type used:  Cigarettes


Smoking Status:  Current Everyday Smoker


Use of E-Cig and/or Vaping dev:  No


Substance use?:  Yes


Substance type:  Marijuana


Substance frequency:  Daily


Alcohol Use?:  Yes


Alcohol Frequency:  Daily


Pt feels they are or have been:  No





Immunizations Up To Date


Tetanus Booster (TDap):  Unknown





Seasonal Allergies


Seasonal Allergies:  No





Past Medical History


Surgeries:  Yes ( X 6;D&C FOR MISCARRIAGE 20)


 Section, Gallbladder


Respiratory:  No


Cardiac:  Yes (with last pregnancy)


Hypertension


Neurological:  No


Sexually Transmitted Disease:  Yes (Chlamydia)


HIV/AIDS:  No


Genitourinary:  No


Gastrointestinal:  No


Musculoskeletal:  Yes


Chronic Back Pain


Endocrine:  Yes (GDM with last pregnancy)


HEENT:  No


Cancer:  No


Psychosocial:  No


Integumentary:  No


Blood Disorders:  No





Family Medical History





Diabetes mellitus


  19 MOTHER


  G8 SISTER


Hypertension (mother)


Paraplegia


  19 FATHER


No Pertinent Family Hx





Physical Exam


Vital Signs





Vital Signs - First Documented








 21





 08:07


 


Temp 36.2


 


Pulse 90


 


Resp 17


 


B/P (MAP) 124/84 (97)


 


O2 Delivery Room Air





Capillary Refill : Less Than 3 Seconds


Height, Weight, BMI


Height: 4'11.00"


Weight: 176lbs. 0.8oz. 79.775610ip; 26.00 BMI


Method:Stated


General Appearance:  WD/WN, no apparent distress


HEENT:  normal ENT inspection


Respiratory:  no respiratory distress


Extremities:  other (Tenderness to the right lateral malleolus, right lateral 

foot, and right proximal dorsal foot.  Minor abrasions to the right proximal 

dorsal foot.  Patient states tenderness and pain with range of motion throughout

the mid right forearm to the mid right hand.)


Neurologic/Psychiatric:  CNs II-XII nml as tested, no motor/sensory deficits, 

alert, normal mood/affect, oriented x 3


Skin:  normal color, warm/dry





Jane Coma Score


Best Eye Response:  (4) Open Spontaneously


Best Verbal Response:  (5) Oriented


Best Motor Response:  (6) Obeys Commands


Jane Total:  15





Progress/Results/Core Measures


Results/Orders


My Orders





Orders - SARA CADET MD


Forearm, Right, 2 Views (21 08:16)


Hand, Right, 3 Views (21 08:16)


Foot, Right, 3 View (21 08:16)


Ankle, Right, 3 Views (21 08:16)





Vital Signs/I&O











 21





 08:07


 


Temp 36.2


 


Pulse 90


 


Resp 17


 


B/P (MAP) 124/84 (97)


 


O2 Delivery Room Air














Blood Pressure Mean:                    97











Progress


Progress Note :  


Progress Note


No acute injuries were identified.  Ace wrap was applied to the right ankle.  

Patient was discharged promptly in an effort to help her make her court date.





Diagnostic Imaging





   Diagonstic Imaging:  Xray


   Plain Films/CT/US/NM/MRI:  ankle (And foot)


Comments


Ankle and foot x-rays viewed by me and report reviewed.  See report below:





NAME:   MEKHI BLANKENSHIP


[facility] REC#:   [id number]


ACCOUNT#:   [id number]


PT STATUS:   [facility] ER


:   1980


PHYSICIAN:   SARA CADET MD


ADMIT DATE:   21/ER


***Signed***


Date of Exam:21





ANKLE, RIGHT, 3 VIEWS








CLINICAL INDICATION: 


Patient fell on her porch and injured right ankle and right foot.





EXAMS:


1: X-ray of the right ankle, 3 views.


2: X-ray of the right foot, 3 views.





COMPARISON: 


None.





FINDINGS:


X-rays of the right foot and right ankle show no acute fracture


or dislocation. There is no significant bone or joint


abnormality. The ankle mortise and syndesmotic joints are


unremarkable.





IMPRESSION:


X-ray of the right ankle right foot shows no acute fracture or


dislocation.





Dictated by: 





  Dictated on workstation # [id number]








Dict:   21 08


Trans:   21 0908


 [id number]





Interpreted by:     GREGORIO GERARD MD


Electronically signed by: GREGORIO GERARD MD 21 0908








   Diagonstic Imaging:  Xray


   Plain Films/CT/US/NM/MRI:  forearm, hand


Comments


X-rays of the right hand and forearm viewed by me and report reviewed.  

Discussed with Dr. Hammonds.  The variance appears to be a chronic problem given no

acute injury to the elbow.  This is not an acute finding that requires treatment

or referral from the ER.  See report below:





NAME:   MEKHI BLANKENSHIP


[facility] REC#:   [id number]


ACCOUNT#:   [id number]


PT STATUS:   DEP ER


:   1980


PHYSICIAN:   SARA CADET MD


ADMIT DATE:   21/ER


***Signed***


Date of Exam:21





FOREARM, RIGHT, 2 VIEWS





CLINICAL INDICATION: Patient fell off her porch and injured right


arm.





EXAMS:


1: X-ray of the right forearm, 2 views.


2: X-ray of the right hand, 3 views.





COMPARISON: None.





FINDINGS:


X-ray of the right hand and right forearm show no acute fracture.


There is roughly 3 mm of negative ulnar variance of unknown age.


Otherwise, the remainder of the right hand and right forearm show


no significant bony abnormality. The scapholunate interval and


distal radioulnar joints otherwise unremarkable. There is no


elbow effusion.





IMPRESSION:


1: There is negative ulnar variance of unknown age.





2: Otherwise, there is no acute fracture or dislocation.





Dictated by: 





  Dictated on workstation # [id number]





Dict:   21 0843


Trans:   21 0908


[facility] [id number]





Interpreted by:     GREGORIO GERARD MD


Electronically signed by: GREGORIO GERARD MD 21 0908





Departure


Impression





   Primary Impression:  


   Right wrist sprain


   Qualified Codes:  S63.501A - Unspecified sprain of right wrist, initial 

   encounter


   Additional Impressions:  


   Right ankle sprain


   Qualified Codes:  S93.401A - Sprain of unspecified ligament of right ankle, 

   initial encounter


   Fall on stairs


   Qualified Codes:  W10.9XXA - Fall (on) (from) unspecified stairs and steps, 

   initial encounter


Disposition:  01 HOME, SELF-CARE


Condition:  Improved





Departure-Patient Inst.


Referrals:  


[facility] (PCP/Family)


Primary Care Physician


Patient Instructions:  Ankle Sprain, Wrist Sprain (DC)





Add. Discharge Instructions:  


You may take ibuprofen up to 600 mg every 6 hours and/or Tylenol (acetaminophen)

up to 1000 mg every 6 hours as needed for pain.


Rest, icing in 20-minute intervals, compressive wrapping, and elevation should 

help with pain and swelling.


If needed, obtain a wrist and/or ankle brace over-the-counter to help support 

the sprains.


Gradually advance activity as pain allows.





All discharge instructions reviewed with patient and/or family. Voiced 

understanding.


Work/School Note:  Work Release Form   Date Seen in the Emergency Department:  

2021


   Return to Work:  2021


      Other Restrictions Listed Below:  Mekhi was released from the ER at 08:50 

2021.  May appear to court











SARA CADET MD        2021 08:20

## 2021-07-28 NOTE — DIAGNOSTIC IMAGING REPORT
CLINICAL INDICATION: 

Patient fell on her porch and injured right ankle and right foot.



EXAMS:

1: X-ray of the right ankle, 3 views.

2: X-ray of the right foot, 3 views.



COMPARISON: 

None.



FINDINGS:

X-rays of the right foot and right ankle show no acute fracture

or dislocation. There is no significant bone or joint

abnormality. The ankle mortise and syndesmotic joints are

unremarkable.



IMPRESSION:

X-ray of the right ankle right foot shows no acute fracture or

dislocation.



Dictated by: 



  Dictated on workstation # [id number]

## 2021-07-28 NOTE — DIAGNOSTIC IMAGING REPORT
CLINICAL INDICATION: Patient fell off her porch and injured right

arm.



EXAMS:

1: X-ray of the right forearm, 2 views.

2: X-ray of the right hand, 3 views.



COMPARISON: None.



FINDINGS:

X-ray of the right hand and right forearm show no acute fracture.

There is roughly 3 mm of negative ulnar variance of unknown age.

Otherwise, the remainder of the right hand and right forearm show

no significant bony abnormality. The scapholunate interval and

distal radioulnar joints otherwise unremarkable. There is no

elbow effusion.



IMPRESSION:

1: There is negative ulnar variance of unknown age.



2: Otherwise, there is no acute fracture or dislocation.



Dictated by: 



  Dictated on workstation # [id number]

## 2021-07-28 NOTE — DIAGNOSTIC IMAGING REPORT
CLINICAL INDICATION: 

Patient fell on her porch and injured right ankle and right foot.



EXAMS:

1: X-ray of the right ankle, 3 views.

2: X-ray of the right foot, 3 views.



COMPARISON: 

None.



FINDINGS:

X-rays of the right foot and right ankle show no acute fracture

or dislocation. There is no significant bone or joint

abnormality. The ankle mortise and syndesmotic joints are

unremarkable.



IMPRESSION:

X-ray of the right ankle right foot shows no acute fracture or

dislocation.



Dictated by: 



  Dictated on workstation # [id number]

## 2021-12-22 ENCOUNTER — HOSPITAL ENCOUNTER (EMERGENCY)
Dept: HOSPITAL 75 - ER | Age: 41
Discharge: HOME | End: 2021-12-22
Payer: SELF-PAY

## 2021-12-22 VITALS — HEIGHT: 58.98 IN | BODY MASS INDEX: 26.18 KG/M2 | WEIGHT: 129.85 LBS

## 2021-12-22 VITALS — SYSTOLIC BLOOD PRESSURE: 107 MMHG | DIASTOLIC BLOOD PRESSURE: 78 MMHG

## 2021-12-22 DIAGNOSIS — F17.210: ICD-10-CM

## 2021-12-22 DIAGNOSIS — M54.9: ICD-10-CM

## 2021-12-22 DIAGNOSIS — G89.29: ICD-10-CM

## 2021-12-22 DIAGNOSIS — Z20.822: ICD-10-CM

## 2021-12-22 DIAGNOSIS — I10: ICD-10-CM

## 2021-12-22 DIAGNOSIS — J06.9: Primary | ICD-10-CM

## 2021-12-22 DIAGNOSIS — J02.9: ICD-10-CM

## 2021-12-22 DIAGNOSIS — Z79.891: ICD-10-CM

## 2021-12-22 PROCEDURE — 87636 SARSCOV2 & INF A&B AMP PRB: CPT

## 2021-12-22 PROCEDURE — 87430 STREP A AG IA: CPT

## 2021-12-22 PROCEDURE — 99284 EMERGENCY DEPT VISIT MOD MDM: CPT

## 2021-12-22 NOTE — ED COUGH/URI
General


Chief Complaint:  Cough/Cold/Flu Symptoms


Stated Complaint:  FEVER/SORE THROAT


Nursing Triage Note:  


PT AMB TO RM 10 WITH COMPLAINT OF SORE THROAT FOR 2 DAYS.


Source:  patient


Exam Limitations:  no limitations





History of Present Illness


Date Seen by Provider:  Dec 22, 2021


Time Seen by Provider:  18:00


Initial Comments


Patient to the ER by private conveyance with chief complaint of body aches 

malaise chills but no fever and occasional dry nonproductive cough for the past 

3 days.  She has not seen anybody about this yet.  Has not been using anything 

for her sore throat or her symptoms.  No nausea vomiting diarrhea.  She is on 

her menses right now.  She does not take any medicines routinely nor does she 

have any significant medical history.  She smokes about a pack of cigarettes 

every 3 days





Allergies and Home Medications


Allergies


Coded Allergies:  


     ibuprofen (Unverified  Allergy, Mild, 09)





Patient Home Medication List


Home Medication List Reviewed:  Yes


Azithromycin (Azithromycin) 250 Mg Tablet, 250 MG PO UD


   Prescribed by: UMESH CARLOS on 10/9/20 1818


Docusate Sodium (Docusate Sodium) 100 Mg Capsule, 100 MG PO BID


   Prescribed by: KARISSA FERREIRA on 3/2/19 0858


Ferrous Sulfate (Ferrous Sulfate) 325 Mg Tablet, 325 MG PO TIDWM


   Prescribed by: SEAN MONTEJO on 16 152


Ketorolac Tromethamine (Ketorolac Tromethamine) 10 Mg Tablet, 10 MG PO Q6H PRN 

for PAIN-MODERATE (5-7)


   Prescribed by: SARA CAMARILLO on 20


Methocarbamol (Methocarbamol) 750 Mg Tablet, 750 MG PO Q6-8HR


   Prescribed by: CLARI ALVARENGA on 21 1500


Naproxen (Naprosyn) 500 Mg Tablet, 500 MG PO BID


   Prescribed by: CLARI ALVARENGA on 21 1500


Ondansetron (Ondansetron Odt) 4 Mg Tab.rapdis, 4 MG PO Q6H PRN for 

NAUSEA/VOMITING


   Prescribed by: TIEN RENDON on 21 181


Oxycodone HCl/Acetaminophen (Percocet 5-325 mg Tablet) 1 Each Tablet, 1 TAB PO 

Q4H


   Prescribed by: KARISSA FERREIRA on 20 1529


Ufn545/Iron Fumarate/FA/Dss (Prenatal 19 Tablet) 1 Each Tablet, 1 EACH PO DAILY,

(Reported)


   Entered as Reported by: JAJA CARTER on 1/3/19 223





Review of Systems


Review of Systems


Constitutional:  chills; No diaphoresis, No fever; malaise


EENTM:  No ear discharge, No ear pain, No blurred vision


Respiratory:  No cough, No phlegm, No short of breath


Cardiovascular:  No chest pain, No palpitations


Gastrointestinal:  No abdominal pain, No constipation, No diarrhea, No nausea, 

No vomiting


Genitourinary:  see HPI; No decreased output


Musculoskeletal:  No back pain, No joint pain


Skin:  no symptoms reported





All Other Systems Reviewed


Negative Unless Noted:  Yes





Past Medical-Social-Family Hx


Patient Social History


Tobacco Use?:  Yes


Tobacco type used:  Cigarettes


Smoking Status:  Current Everyday Smoker


Use of E-Cig and/or Vaping dev:  No


Substance use?:  No


Alcohol Use?:  No


Pt feels they are or have been:  No





Immunizations Up To Date


Tetanus Booster (TDap):  Unknown


Influenza Vaccine Up-to-Date:  No; Not Current


First/Initial COVID19 Vaccinat:  MARCH


Second COVID19 Vaccination Jose Alfredo:  APRIL


Third COVID19 Vaccination Date:  MARCH





Seasonal Allergies


Seasonal Allergies:  No





Past Medical History


Surgeries:  Yes ( X 6;D&C FOR MISCARRIAGE 20)


 Section, Gallbladder


Respiratory:  No


Cardiac:  Yes (with last pregnancy)


Hypertension


Neurological:  No


Sexually Transmitted Disease:  Yes (Chlamydia)


HIV/AIDS:  No


Genitourinary:  No


Gastrointestinal:  No


Musculoskeletal:  Yes


Chronic Back Pain


Endocrine:  Yes (GDM with last pregnancy)


HEENT:  No


Cancer:  No


Psychosocial:  No


Integumentary:  No


Blood Disorders:  No





Family Medical History





Diabetes mellitus


  19 MOTHER


  G8 SISTER


Hypertension (mother)


Paraplegia


  19 FATHER


No Pertinent Family Hx





Physical Exam





Vital Signs - First Documented








 21





 17:41


 


Temp 37.6


 


Pulse 105


 


Resp 20


 


B/P (MAP) 115/73 (87)


 


Pulse Ox 100


 


O2 Delivery Room Air





Capillary Refill : Less Than 3 Seconds


Height: 4'11.00"


Weight: 176lbs. 0.8oz. 79.696224oh; 26.00 BMI


Method:Stated


General Appearance:  WD/WN, mild distress


Eyes:  Bilateral Eye Normal Inspection, Bilateral Eye PERRL, Bilateral Eye EOMI


HEENT:  PERRL/EOMI, normal ENT inspection, pharynx normal


Neck:  full range of motion, supple, normal inspection, tender lateral (Shotty 

bilateral anterior cervical lymphadenopathy mildly tender to palpation.)


Respiratory:  lungs clear, normal breath sounds, no respiratory distress, no 

accessory muscle use


Cardiovascular:  normal peripheral pulses, regular rate, rhythm, no edema


Gastrointestinal:  normal bowel sounds, non tender, soft


Neurologic/Psychiatric:  alert, normal mood/affect, oriented x 3





Progress/Results/Core Measures


Suspected Sepsis


SIRS


Temperature: 


Pulse: 105 


Respiratory Rate: 20


 


Blood Pressure 115 /73 


Mean: 87





Results/Orders


Lab Results





Laboratory Tests








Test


 21


17:44 Range/Units


 


 


Influenza Type A (RT-PCR) Not Detected  Not Detecte  


 


Influenza Type B (RT-PCR) Not Detected  Not Detecte  


 


SARS-CoV-2 RNA (RT-PCR) Not Detected  Not Detecte  


 


Group A Streptococcus Screen NEGATIVE  NEGATIVE  








My Orders





Orders - TIEN RENDON


Ketorolac Injection (Toradol Injection) (21 18:15)





Vital Signs/I&O











 21





 17:41


 


Temp 37.6


 


Pulse 105


 


Resp 20


 


B/P (MAP) 115/73 (87)


 


Pulse Ox 100


 


O2 Delivery Room Air





Capillary Refill : Less Than 3 Seconds








Blood Pressure Mean:                    87








Progress Note :  


   Time:  18:13


Progress Note


Toradol IM for her body aches.  Recommended conservative management of sore 

throat.  Influenza, Covid and rapid strep swabs.





Departure


Impression





   Primary Impression:  


   Upper respiratory tract infection


   Qualified Codes:  J06.9 - Acute upper respiratory infection, unspecified


   Additional Impression:  


   Pharyngitis


   Qualified Codes:  J02.9 - Acute pharyngitis, unspecified


Disposition:   HOME, SELF-CARE


Condition:  Stable





Departure-Patient Inst.


Decision time for Depature:  18:30


Referrals:  


[facility] (PCP/Family)


Primary Care Physician


Patient Instructions:  Viral Pharyngitis  (DC)





Add. Discharge Instructions:  


Drink lots of fluids especially sports drinks and water.  Salty comfort foods 

such as broth or chicken noodle soup are recommended.


Salt water gargles as often as necessary for sore throat.


You may also use Chloraseptic spray, throat lozenges etc. for your sore throat.


Tylenol 1000 mg every 8 hours as necessary for body aches or fever.


Ibuprofen 800 mg every 8 hours as necessary for body aches or fever.


Vapor rubs and humidifiers are also helpful for congestion.


Ondansetron 1 tablet every 6 hours as necessary for nausea or vomiting.


All discharge instructions reviewed with patient and/or family. Voiced 

understanding.


Scripts


Ondansetron (Ondansetron Odt) 4 Mg Tab.rapdis


4 MG PO Q6H PRN for NAUSEA/VOMITING, #8 TAB 0 Refills


   Prov: TIEN RENDON         21


Work/School Note:  Work Release Form   Date Seen in the Emergency Department:  

Dec 22, 2021


   Return to Work:  Dec 26, 2021


   Restrictions:  No Restrictions











TIEN RENDON                 Dec 22, 2021 18:16

## 2022-11-10 NOTE — NUR
PT STILL UNDECIDED ABOUT BEING DISCHARGED TODAY. Personalized Preventive Plan for Padmaja Elizondo - 11/10/2022  Medicare offers a range of preventive health benefits. Some of the tests and screenings are paid in full while other may be subject to a deductible, co-insurance, and/or copay. Some of these benefits include a comprehensive review of your medical history including lifestyle, illnesses that may run in your family, and various assessments and screenings as appropriate. After reviewing your medical record and screening and assessments performed today your provider may have ordered immunizations, labs, imaging, and/or referrals for you. A list of these orders (if applicable) as well as your Preventive Care list are included within your After Visit Summary for your review. Other Preventive Recommendations:    A preventive eye exam performed by an eye specialist is recommended every 1-2 years to screen for glaucoma; cataracts, macular degeneration, and other eye disorders. A preventive dental visit is recommended every 6 months. Try to get at least 150 minutes of exercise per week or 10,000 steps per day on a pedometer . Order or download the FREE \"Exercise & Physical Activity: Your Everyday Guide\" from [employer]. Call 0-395.627.1899 or search [employer] online. You need 8135-1973 mg of calcium and 7055-9318 IU of vitamin D per day. It is possible to meet your calcium requirement with diet alone, but a vitamin D supplement is usually necessary to meet this goal.  When exposed to the sun, use a sunscreen that protects against both UVA and UVB radiation with an SPF of 30 or greater. Reapply every 2 to 3 hours or after sweating, drying off with a towel, or swimming. Always wear a seat belt when traveling in a car. Always wear a helmet when riding a bicycle or motorcycle.